# Patient Record
Sex: MALE | URBAN - METROPOLITAN AREA
[De-identification: names, ages, dates, MRNs, and addresses within clinical notes are randomized per-mention and may not be internally consistent; named-entity substitution may affect disease eponyms.]

---

## 2020-11-25 PROBLEM — U07.1 PNEUMONIA DUE TO COVID-19 VIRUS: Status: ACTIVE | Noted: 2020-11-25

## 2020-11-25 PROBLEM — J12.82 PNEUMONIA DUE TO COVID-19 VIRUS: Status: ACTIVE | Noted: 2020-11-25

## 2020-11-26 ENCOUNTER — APPOINTMENT (OUTPATIENT)
Dept: GENERAL RADIOLOGY | Age: 65
DRG: 177 | End: 2020-11-26
Attending: INTERNAL MEDICINE
Payer: MEDICARE

## 2020-11-26 ENCOUNTER — HOSPITAL ENCOUNTER (INPATIENT)
Age: 65
LOS: 12 days | Discharge: HOME | DRG: 177 | End: 2020-12-08
Attending: INTERNAL MEDICINE | Admitting: INTERNAL MEDICINE
Payer: MEDICARE

## 2020-11-26 LAB
A/G RATIO: 0.9 (ref 1.1–2.2)
ALBUMIN SERPL-MCNC: 3.4 G/DL (ref 3.4–5)
ALP BLD-CCNC: 58 U/L (ref 40–129)
ALT SERPL-CCNC: 35 U/L (ref 10–40)
ANION GAP SERPL CALCULATED.3IONS-SCNC: 10 MMOL/L (ref 3–16)
AST SERPL-CCNC: 54 U/L (ref 15–37)
BILIRUB SERPL-MCNC: 0.4 MG/DL (ref 0–1)
BUN BLDV-MCNC: 18 MG/DL (ref 7–20)
CALCIUM SERPL-MCNC: 8.4 MG/DL (ref 8.3–10.6)
CHLORIDE BLD-SCNC: 98 MMOL/L (ref 99–110)
CO2: 20 MMOL/L (ref 21–32)
CREAT SERPL-MCNC: 0.9 MG/DL (ref 0.8–1.3)
GFR AFRICAN AMERICAN: >60
GFR NON-AFRICAN AMERICAN: >60
GLOBULIN: 3.9 G/DL
GLUCOSE BLD-MCNC: 279 MG/DL (ref 70–99)
POTASSIUM REFLEX MAGNESIUM: 4.9 MMOL/L (ref 3.5–5.1)
SODIUM BLD-SCNC: 128 MMOL/L (ref 136–145)
TOTAL PROTEIN: 7.3 G/DL (ref 6.4–8.2)

## 2020-11-26 PROCEDURE — 6370000000 HC RX 637 (ALT 250 FOR IP): Performed by: INTERNAL MEDICINE

## 2020-11-26 PROCEDURE — 86850 RBC ANTIBODY SCREEN: CPT

## 2020-11-26 PROCEDURE — 6360000002 HC RX W HCPCS: Performed by: INTERNAL MEDICINE

## 2020-11-26 PROCEDURE — 71045 X-RAY EXAM CHEST 1 VIEW: CPT

## 2020-11-26 PROCEDURE — 86900 BLOOD TYPING SEROLOGIC ABO: CPT

## 2020-11-26 PROCEDURE — 36415 COLL VENOUS BLD VENIPUNCTURE: CPT

## 2020-11-26 PROCEDURE — 80053 COMPREHEN METABOLIC PANEL: CPT

## 2020-11-26 PROCEDURE — 2580000003 HC RX 258: Performed by: INTERNAL MEDICINE

## 2020-11-26 PROCEDURE — 86901 BLOOD TYPING SEROLOGIC RH(D): CPT

## 2020-11-26 PROCEDURE — 94761 N-INVAS EAR/PLS OXIMETRY MLT: CPT

## 2020-11-26 PROCEDURE — 2700000000 HC OXYGEN THERAPY PER DAY

## 2020-11-26 PROCEDURE — 2060000000 HC ICU INTERMEDIATE R&B

## 2020-11-26 PROCEDURE — 2500000003 HC RX 250 WO HCPCS: Performed by: INTERNAL MEDICINE

## 2020-11-26 PROCEDURE — XW13325 TRANSFUSION OF CONVALESCENT PLASMA (NONAUTOLOGOUS) INTO PERIPHERAL VEIN, PERCUTANEOUS APPROACH, NEW TECHNOLOGY GROUP 5: ICD-10-PCS | Performed by: INTERNAL MEDICINE

## 2020-11-26 PROCEDURE — 99291 CRITICAL CARE FIRST HOUR: CPT | Performed by: INTERNAL MEDICINE

## 2020-11-26 RX ORDER — LORATADINE 10 MG/1
10 CAPSULE, LIQUID FILLED ORAL DAILY
COMMUNITY

## 2020-11-26 RX ORDER — ACETAMINOPHEN 325 MG/1
650 TABLET ORAL EVERY 6 HOURS PRN
Status: DISCONTINUED | OUTPATIENT
Start: 2020-11-26 | End: 2020-12-08 | Stop reason: HOSPADM

## 2020-11-26 RX ORDER — SODIUM CHLORIDE 0.9 % (FLUSH) 0.9 %
10 SYRINGE (ML) INJECTION PRN
Status: DISCONTINUED | OUTPATIENT
Start: 2020-11-26 | End: 2020-12-08 | Stop reason: HOSPADM

## 2020-11-26 RX ORDER — 0.9 % SODIUM CHLORIDE 0.9 %
30 INTRAVENOUS SOLUTION INTRAVENOUS PRN
Status: DISCONTINUED | OUTPATIENT
Start: 2020-11-26 | End: 2020-12-08 | Stop reason: HOSPADM

## 2020-11-26 RX ORDER — DEXAMETHASONE SODIUM PHOSPHATE 10 MG/ML
10 INJECTION, SOLUTION INTRAMUSCULAR; INTRAVENOUS DAILY
Status: DISCONTINUED | OUTPATIENT
Start: 2020-11-27 | End: 2020-11-26

## 2020-11-26 RX ORDER — SODIUM CHLORIDE 9 MG/ML
INJECTION, SOLUTION INTRAVENOUS CONTINUOUS
Status: DISCONTINUED | OUTPATIENT
Start: 2020-11-26 | End: 2020-11-26

## 2020-11-26 RX ORDER — DEXTROSE MONOHYDRATE 25 G/50ML
12.5 INJECTION, SOLUTION INTRAVENOUS PRN
Status: DISCONTINUED | OUTPATIENT
Start: 2020-11-26 | End: 2020-12-08 | Stop reason: HOSPADM

## 2020-11-26 RX ORDER — LOSARTAN POTASSIUM AND HYDROCHLOROTHIAZIDE 12.5; 1 MG/1; MG/1
1 TABLET ORAL DAILY
Status: ON HOLD | COMMUNITY
End: 2020-12-08 | Stop reason: HOSPADM

## 2020-11-26 RX ORDER — ATORVASTATIN CALCIUM 40 MG/1
40 TABLET, FILM COATED ORAL DAILY
COMMUNITY

## 2020-11-26 RX ORDER — ONDANSETRON 2 MG/ML
4 INJECTION INTRAMUSCULAR; INTRAVENOUS EVERY 6 HOURS PRN
Status: DISCONTINUED | OUTPATIENT
Start: 2020-11-26 | End: 2020-12-08 | Stop reason: HOSPADM

## 2020-11-26 RX ORDER — INSULIN GLARGINE 100 [IU]/ML
10 INJECTION, SOLUTION SUBCUTANEOUS DAILY
Status: DISCONTINUED | OUTPATIENT
Start: 2020-11-26 | End: 2020-11-27

## 2020-11-26 RX ORDER — NICOTINE POLACRILEX 4 MG
15 LOZENGE BUCCAL PRN
Status: DISCONTINUED | OUTPATIENT
Start: 2020-11-26 | End: 2020-12-08 | Stop reason: HOSPADM

## 2020-11-26 RX ORDER — 0.9 % SODIUM CHLORIDE 0.9 %
20 INTRAVENOUS SOLUTION INTRAVENOUS ONCE
Status: DISCONTINUED | OUTPATIENT
Start: 2020-11-26 | End: 2020-11-26

## 2020-11-26 RX ORDER — DEXAMETHASONE SODIUM PHOSPHATE 10 MG/ML
6 INJECTION, SOLUTION INTRAMUSCULAR; INTRAVENOUS DAILY
Status: DISCONTINUED | OUTPATIENT
Start: 2020-11-26 | End: 2020-11-26

## 2020-11-26 RX ORDER — AMLODIPINE BESYLATE 5 MG/1
5 TABLET ORAL DAILY
COMMUNITY

## 2020-11-26 RX ORDER — POLYETHYLENE GLYCOL 3350 17 G/17G
17 POWDER, FOR SOLUTION ORAL DAILY PRN
Status: DISCONTINUED | OUTPATIENT
Start: 2020-11-26 | End: 2020-12-08 | Stop reason: HOSPADM

## 2020-11-26 RX ORDER — LEVOTHYROXINE SODIUM 0.1 MG/1
100 TABLET ORAL DAILY
COMMUNITY

## 2020-11-26 RX ORDER — DEXAMETHASONE SODIUM PHOSPHATE 10 MG/ML
6 INJECTION, SOLUTION INTRAMUSCULAR; INTRAVENOUS ONCE
Status: COMPLETED | OUTPATIENT
Start: 2020-11-26 | End: 2020-11-26

## 2020-11-26 RX ORDER — PROMETHAZINE HYDROCHLORIDE 25 MG/1
12.5 TABLET ORAL EVERY 6 HOURS PRN
Status: DISCONTINUED | OUTPATIENT
Start: 2020-11-26 | End: 2020-12-08 | Stop reason: HOSPADM

## 2020-11-26 RX ORDER — SODIUM CHLORIDE 0.9 % (FLUSH) 0.9 %
10 SYRINGE (ML) INJECTION EVERY 12 HOURS SCHEDULED
Status: DISCONTINUED | OUTPATIENT
Start: 2020-11-26 | End: 2020-12-08 | Stop reason: HOSPADM

## 2020-11-26 RX ORDER — DEXTROSE MONOHYDRATE 50 MG/ML
100 INJECTION, SOLUTION INTRAVENOUS PRN
Status: DISCONTINUED | OUTPATIENT
Start: 2020-11-26 | End: 2020-12-08 | Stop reason: HOSPADM

## 2020-11-26 RX ORDER — GLIPIZIDE 10 MG/1
10 TABLET ORAL
COMMUNITY

## 2020-11-26 RX ORDER — 0.9 % SODIUM CHLORIDE 0.9 %
20 INTRAVENOUS SOLUTION INTRAVENOUS ONCE
Status: DISCONTINUED | OUTPATIENT
Start: 2020-11-26 | End: 2020-12-01

## 2020-11-26 RX ORDER — ACETAMINOPHEN 650 MG/1
650 SUPPOSITORY RECTAL EVERY 6 HOURS PRN
Status: DISCONTINUED | OUTPATIENT
Start: 2020-11-26 | End: 2020-12-08 | Stop reason: HOSPADM

## 2020-11-26 RX ORDER — DEXAMETHASONE SODIUM PHOSPHATE 10 MG/ML
6 INJECTION, SOLUTION INTRAMUSCULAR; INTRAVENOUS DAILY
Status: DISCONTINUED | OUTPATIENT
Start: 2020-11-27 | End: 2020-12-06

## 2020-11-26 RX ADMIN — Medication 10 ML: at 08:45

## 2020-11-26 RX ADMIN — ENOXAPARIN SODIUM 30 MG: 30 INJECTION SUBCUTANEOUS at 08:45

## 2020-11-26 RX ADMIN — Medication 10 ML: at 23:17

## 2020-11-26 RX ADMIN — INSULIN GLARGINE 10 UNITS: 100 INJECTION, SOLUTION SUBCUTANEOUS at 13:08

## 2020-11-26 RX ADMIN — SODIUM CHLORIDE: 9 INJECTION, SOLUTION INTRAVENOUS at 01:25

## 2020-11-26 RX ADMIN — ACETAMINOPHEN 650 MG: 325 TABLET ORAL at 16:29

## 2020-11-26 RX ADMIN — Medication 10 ML: at 16:30

## 2020-11-26 RX ADMIN — INSULIN LISPRO 3 UNITS: 100 INJECTION, SOLUTION INTRAVENOUS; SUBCUTANEOUS at 23:33

## 2020-11-26 RX ADMIN — ENOXAPARIN SODIUM 30 MG: 30 INJECTION SUBCUTANEOUS at 23:16

## 2020-11-26 RX ADMIN — REMDESIVIR 200 MG: 100 INJECTION, POWDER, LYOPHILIZED, FOR SOLUTION INTRAVENOUS at 08:45

## 2020-11-26 RX ADMIN — DEXAMETHASONE SODIUM PHOSPHATE 6 MG: 10 INJECTION, SOLUTION INTRAMUSCULAR; INTRAVENOUS at 08:45

## 2020-11-26 RX ADMIN — DEXAMETHASONE SODIUM PHOSPHATE 6 MG: 10 INJECTION, SOLUTION INTRAMUSCULAR; INTRAVENOUS at 16:30

## 2020-11-26 ASSESSMENT — PAIN SCALES - GENERAL
PAINLEVEL_OUTOF10: 0
PAINLEVEL_OUTOF10: 0

## 2020-11-26 NOTE — PROGRESS NOTES
Patient admitted to room 228 from dirct admit. Patient oriented to room, call light, bed rails, phone, lights and bathroom. Patient instructed about the schedule of the day including: vital sign frequency, lab draws, possible tests, frequency of MD and staff rounds, daily weights, I &O's and prescribed diet. Bed alarm deferred patient low fall risk and refuses alarm. Telemetry box in place, patient aware of placement and reason. Bed locked, in lowest position, side rails up 2/4, call light within reach. Recliner Assessment:     Patient is able to demonstrate the ability to move from a reclining position to an upright position within the recliner. 4 Eyes Skin Assessment:     The patient is being assess for   Admission       Areas assessed by both nurses:   [x]   Head, Face, and Ears   [x]   Shoulders, Back, and Chest, Abdomen  [x]   Arms, Elbows, and Hands   [x]   Coccyx, Sacrum, and Ischium  [x]   Legs, Feet, and Heels        Scattered bruising    Does the Patient have Skin Breakdown?   No          Doc Prevention initiated:  No   Wound Care Orders initiated:  No      [facility] nurse consulted for Pressure Injury (Stage 3,4, Unstageable, DTI, NWPT, Complex wounds)and New or Established Ostomies:  No      Primary Nurse eSignature: Electronically signed by Anni Bourne RN on 11/26/20 at 4:15 AM EST

## 2020-11-26 NOTE — PROGRESS NOTES
Lab communicated to me that a ticket is out to allow labs to flow into Epic and show up in our system. Until this is fixed we cannot get convalescent plasma to the patient.

## 2020-11-26 NOTE — PROGRESS NOTES
Spoke to Dr. Gage Hoff regarding orders for patient's regular home medications and for sliding scale insulin. Dr. Gage Hoff is to place orders.

## 2020-11-26 NOTE — PROGRESS NOTES
Continuous pulse ox not picking up after switching tele box, wires, pulse ox. Respiratory placed a standalone pulse ox in room.

## 2020-11-26 NOTE — PROGRESS NOTES
Report given and care transferred to \[facility]\"". Patient in stable condition on 13 L HFNC at time of care transfer.

## 2020-11-26 NOTE — PROGRESS NOTES
11/26/20 0845   Vital Signs   Temp 98.4 °F (36.9 °C)   Temp Source Oral   Pulse 100   Heart Rate Source Monitor   Resp 18   /66   BP Location Right upper arm   BP Upper/Lower Upper   Patient Position Semi fowlers   Level of Consciousness 0   MEWS Score 1   Patient Currently in Pain Denies   Pain Assessment   Pain Assessment 0-10   Pain Level 0   Oxygen Therapy   SpO2 (!) 88 %   O2 Device Nasal cannula   O2 Flow Rate (L/min) 5 L/min     Patient's vital signs as above this am. Switched patient to high flow nasal cannula at 6 L and patient's oxygen saturation stayed at 89%. Patient then was increased to 10 L HFNC and came up to 93%. I then switched out patient's telemetry box and was able to titrate patient's oxygen down to 6 L HFNC again and got a reading of 93%.

## 2020-11-26 NOTE — CONSULTS
Reason for referral and CC: COVIS 19 pneumonia    HISTORY OF PRESENT ILLNESS: 69-year-old male with a history of diabetes and hypertension presented to outside emergency room with complaint of progressive shortness of breath. Found to be hypoxic and have COVID-19 pneumonia was admitted for further care. Past Medical History:   Diagnosis Date    Diabetes mellitus ([address]     Hyperlipidemia     Hypertension     Thyroid disease      Past Surgical History:   Procedure Laterality Date    CORONARY ANGIOPLASTY      HERNIA REPAIR       Family History  family history is not on file. Social History:  reports that he quit smoking about 17 years ago. His smoking use included cigarettes. He has a 5.00 pack-year smoking history. He has never used smokeless tobacco.   has no history on file for alcohol. ALLERGIES:  Patient has No Known Allergies.   Continuous Infusions:   dextrose       Scheduled Meds:   sodium chloride flush  10 mL Intravenous 2 times per day    enoxaparin  30 mg Subcutaneous BID    [START ON 11/27/2020] remdesivir IVPB  100 mg Intravenous Q24H    insulin lispro  0-12 Units Subcutaneous TID WC    insulin lispro  0-6 Units Subcutaneous Nightly    insulin glargine  10 Units Subcutaneous Daily    sodium chloride  20 mL Intravenous Once    [START ON 11/27/2020] dexamethasone  6 mg Intravenous Daily    sodium chloride  20 mL Intravenous Once     PRN Meds:  sodium chloride flush, acetaminophen **OR** acetaminophen, polyethylene glycol, promethazine **OR** ondansetron, sodium chloride, glucose, dextrose, glucagon (rDNA), dextrose    REVIEW OF SYSTEMS:  Constitutional: Negative for fever  HENT: Negative for sore throat  Eyes: Negative for redness   Respiratory: + for dyspnea, cough  Cardiovascular: Negative for chest pain  Gastrointestinal: Negative for vomiting, diarrhea   Genitourinary: Negative for hematuria   Musculoskeletal: Negative for arthralgias   Skin: Negative for rash  Neurological: Negative for syncope  Hematological: Negative for adenopathy  Psychiatric/Behavorial: Negative for anxiety    PHYSICAL EXAM: /74   Pulse 91   Temp 100.3 °F (37.9 °C) (Oral)   Resp 20   Ht 5' 9\" (1.753 m)   Wt 231 lb 8 oz (105 kg)   SpO2 91%   BMI 34.19 kg/m²  on 13lpm HFNC  Constitutional:  No acute distress. Eyes: PERRL. Conjunctivae anicteric. ENT: Normal nose. Normal tongue. Neck:  Trachea is midline. No thyroid tenderness. Respiratory: No accessory muscle usage. decreased breath sounds. No wheezes. No rales. No Rhonchi. Cardiovascular: Normal S1S2. No digit clubbing. No digit cyanosis. No LE edema. Capillary refill is normal.   Gastrointestinal: No mass palpated. No tenderness palpated. No umbilical hernia. Lymphatic: No cervical or supraclavicular adenopathy. Skin: No rash on the exposed extremities. No Nodules or induration on exposed extremities. Psychiatric: No anxiety or Agitation. Alert and Oriented to person, place and time. CBC: No results for input(s): WBC, HGB, HCT, MCV, PLT in the last 72 hours. BMP:   Recent Labs     11/26/20  0629   *   K 4.9   CL 98*   CO2 20*   BUN 18   CREATININE 0.9        Recent Labs     11/26/20  0629   AST 54*   ALT 35   BILITOT 0.4   ALKPHOS 58     No results for input(s): PROTIME, INR in the last 72 hours. No results for input(s): NITRITE, COLORU, PHUR, LABCAST, WBCUA, RBCUA, MUCUS, TRICHOMONAS, YEAST, BACTERIA, CLARITYU, SPECGRAV, LEUKOCYTESUR, UROBILINOGEN, BILIRUBINUR, BLOODU, GLUCOSEU, AMORPHOUS in the last 72 hours. Invalid input(s): KETONESU  No results for input(s): PHART, UYV2HOC, PO2ART in the last 72 hours.     Chest imaging was reviewed by me and showed CXR 11/26  Patchy airspace opacities suspicious for pneumonia.  An atypical or viral    pneumonia is suspected       ASSESSMENT:  · COVID 19 pneumonia  · Acute hypoxic respiratory failure  · HTN  · DM2  · Hyponatremia    PLAN:  · Patient is on high flow nasal cannula for life-threatening respiratory failure  Supplemental oxygen to maintain SaO2 >92%; wean as tolerated   · Transfuse 1 u CCP today  · Remdesivir day #1; monitor LFT and renal  · Decadron day #1; monitor glucose  · SSI/lantus  · Lovenox BID  · CCT 32 min    Thank you Valentín Anthony MD for this consult

## 2020-11-26 NOTE — PROGRESS NOTES
4 Eyes Skin Assessment     The patient is being assess for   Transfer to New Unit    I agree that 2 RN's have performed a thorough Head to Toe Skin Assessment on the patient. ALL assessment sites listed below have been assessed. Areas assessed by both nurses:   [x]   Head, Face, and Ears   [x]   Shoulders, Back, and Chest, Abdomen  [x]   Arms, Elbows, and Hands   [x]   Coccyx, Sacrum, and Ischium  [x]   Legs, Feet, and Heels        Scattered scabs and bruises, dry flaky lower extremities. **SHARE this note so that the co-signing nurse is able to place an eSignature**    Co-signer eSignature: {Esignature:[id number]}    Does the Patient have Skin Breakdown?   No          Doc Prevention initiated:  No   Wound Care Orders initiated:  No      [facility] nurse consulted for Pressure Injury (Stage 3,4, Unstageable, DTI, NWPT, Complex wounds)and New or Established Ostomies:  No      Primary Nurse eSignature: Electronically signed by Nai Cornejo RN on 11/26/20 at 5:22 PM EST

## 2020-11-27 LAB
ABO/RH: NORMAL
ANTIBODY SCREEN: NORMAL
BLOOD BANK DISPENSE STATUS: NORMAL
BLOOD BANK PRODUCT CODE: NORMAL
BPU ID: NORMAL
DESCRIPTION BLOOD BANK: NORMAL
GLUCOSE BLD-MCNC: 200 MG/DL (ref 70–99)
GLUCOSE BLD-MCNC: 203 MG/DL (ref 70–99)
GLUCOSE BLD-MCNC: 217 MG/DL (ref 70–99)
GLUCOSE BLD-MCNC: 263 MG/DL (ref 70–99)
PERFORMED ON: ABNORMAL

## 2020-11-27 PROCEDURE — 94660 CPAP INITIATION&MGMT: CPT

## 2020-11-27 PROCEDURE — 6360000002 HC RX W HCPCS: Performed by: INTERNAL MEDICINE

## 2020-11-27 PROCEDURE — 2580000003 HC RX 258: Performed by: INTERNAL MEDICINE

## 2020-11-27 PROCEDURE — 99291 CRITICAL CARE FIRST HOUR: CPT | Performed by: INTERNAL MEDICINE

## 2020-11-27 PROCEDURE — 94761 N-INVAS EAR/PLS OXIMETRY MLT: CPT

## 2020-11-27 PROCEDURE — U0003 INFECTIOUS AGENT DETECTION BY NUCLEIC ACID (DNA OR RNA); SEVERE ACUTE RESPIRATORY SYNDROME CORONAVIRUS 2 (SARS-COV-2) (CORONAVIRUS DISEASE [COVID-19]), AMPLIFIED PROBE TECHNIQUE, MAKING USE OF HIGH THROUGHPUT TECHNOLOGIES AS DESCRIBED BY CMS-2020-01-R: HCPCS

## 2020-11-27 PROCEDURE — 6370000000 HC RX 637 (ALT 250 FOR IP): Performed by: INTERNAL MEDICINE

## 2020-11-27 PROCEDURE — XW033E5 INTRODUCTION OF REMDESIVIR ANTI-INFECTIVE INTO PERIPHERAL VEIN, PERCUTANEOUS APPROACH, NEW TECHNOLOGY GROUP 5: ICD-10-PCS | Performed by: INTERNAL MEDICINE

## 2020-11-27 PROCEDURE — 2060000000 HC ICU INTERMEDIATE R&B

## 2020-11-27 PROCEDURE — 2500000003 HC RX 250 WO HCPCS: Performed by: INTERNAL MEDICINE

## 2020-11-27 PROCEDURE — 2700000000 HC OXYGEN THERAPY PER DAY

## 2020-11-27 RX ORDER — LEVOTHYROXINE SODIUM 0.1 MG/1
100 TABLET ORAL DAILY
Status: DISCONTINUED | OUTPATIENT
Start: 2020-11-27 | End: 2020-12-08 | Stop reason: HOSPADM

## 2020-11-27 RX ORDER — INSULIN GLARGINE 100 [IU]/ML
15 INJECTION, SOLUTION SUBCUTANEOUS 2 TIMES DAILY
Status: DISCONTINUED | OUTPATIENT
Start: 2020-11-27 | End: 2020-12-01

## 2020-11-27 RX ORDER — LOSARTAN POTASSIUM 25 MG/1
50 TABLET ORAL DAILY
Status: DISCONTINUED | OUTPATIENT
Start: 2020-11-27 | End: 2020-12-08 | Stop reason: HOSPADM

## 2020-11-27 RX ADMIN — INSULIN LISPRO 6 UNITS: 100 INJECTION, SOLUTION INTRAVENOUS; SUBCUTANEOUS at 17:11

## 2020-11-27 RX ADMIN — INSULIN LISPRO 3 UNITS: 100 INJECTION, SOLUTION INTRAVENOUS; SUBCUTANEOUS at 21:48

## 2020-11-27 RX ADMIN — Medication 5000 UNITS: at 10:46

## 2020-11-27 RX ADMIN — INSULIN GLARGINE 15 UNITS: 100 INJECTION, SOLUTION SUBCUTANEOUS at 11:45

## 2020-11-27 RX ADMIN — DEXAMETHASONE SODIUM PHOSPHATE 6 MG: 10 INJECTION, SOLUTION INTRAMUSCULAR; INTRAVENOUS at 10:17

## 2020-11-27 RX ADMIN — INSULIN LISPRO 6 UNITS: 100 INJECTION, SOLUTION INTRAVENOUS; SUBCUTANEOUS at 12:49

## 2020-11-27 RX ADMIN — INSULIN GLARGINE 15 UNITS: 100 INJECTION, SOLUTION SUBCUTANEOUS at 21:48

## 2020-11-27 RX ADMIN — Medication 10 ML: at 21:46

## 2020-11-27 RX ADMIN — LOSARTAN POTASSIUM 50 MG: 25 TABLET, FILM COATED ORAL at 15:16

## 2020-11-27 RX ADMIN — LEVOTHYROXINE SODIUM 100 MCG: 100 TABLET ORAL at 15:16

## 2020-11-27 RX ADMIN — REMDESIVIR 100 MG: 100 INJECTION, POWDER, LYOPHILIZED, FOR SOLUTION INTRAVENOUS at 10:17

## 2020-11-27 RX ADMIN — ENOXAPARIN SODIUM 40 MG: 40 INJECTION SUBCUTANEOUS at 21:46

## 2020-11-27 ASSESSMENT — PAIN SCALES - GENERAL
PAINLEVEL_OUTOF10: 0
PAINLEVEL_OUTOF10: 0

## 2020-11-27 NOTE — PROGRESS NOTES
Patient was hovering 88-92% on 15+ L/M HFC. Spoke with Dr. Dylan Juarez an obtained VO to place on heated humidified high flow oxygen (AIRVO). Placed patient on 75% and 35 L/M temp of 34 degrees. SPO2 to 93%.

## 2020-11-27 NOTE — PROGRESS NOTES
Pulmonary & Critical Care Medicine ICU Progress Note    CC: COVID 19 pneumonia    Events of Last 24 hours: hypoxi worsened and pt started on vapotherm  75% Fio2/35lpm  Had felt ill for up to 2 weeks    Invasive Lines: IV:     MV:  D     / / /FiO2 : 75 %  No results for input(s): PHART, IDE4UQV, PO2ART in the last 72 hours. IV:   dextrose       Vitals:  BP (!) 142/79   Pulse 86   Temp 97 °F (36.1 °C) (Oral)   Resp 20   Ht 5' 9\" (1.753 m)   Wt 232 lb 3 oz (105.3 kg)   SpO2 92%   BMI 34.29 kg/m²   on vapotherm    Intake/Output Summary (Last 24 hours) at 11/27/2020 1344  Last data filed at 11/27/2020 1242  Gross per 24 hour   Intake 1295 ml   Output 1900 ml   Net -605 ml     EXAM:  Constitutional: ill appearing. Eyes: PERRL. No sclera icterus. ENT: Normal Nose. Normal Tongue. Neck:  Trachea is midline. No thyroid tenderness. Respiratory: No accessory muscle usage. decreased breath sounds. No wheezes. No rales. No Rhonchi. Cardiovascular: Normal S1S2. Brady Zane No murmur. No digit cyanosis. No digit clubbing. No LE edema. GI: Non-tender. Non-distended. Normal bowel sounds. No masses. No umbilical hernia. Skin: No rash on the exposed extremities. No Nodules on exposed extremities. Neuro: Alert. Follows commands. moves all extremities. Psych: No agitation, no anxiety.     Scheduled Meds:   insulin glargine  15 Units Subcutaneous BID    insulin lispro  0-18 Units Subcutaneous TID     insulin lispro  0-9 Units Subcutaneous Nightly    enoxaparin  40 mg Subcutaneous BID    vitamin D3  5,000 Units Oral Daily    levothyroxine  100 mcg Oral Daily    losartan  50 mg Oral Daily    sodium chloride flush  10 mL Intravenous 2 times per day    remdesivir IVPB  100 mg Intravenous Q24H    sodium chloride  20 mL Intravenous Once    dexamethasone  6 mg Intravenous Daily     PRN Meds:  sodium chloride flush, acetaminophen **OR** acetaminophen, polyethylene glycol, promethazine **OR** ondansetron, sodium chloride, glucose, dextrose, glucagon (rDNA), dextrose    Results:  CBC: No results for input(s): WBC, HGB, HCT, MCV, PLT in the last 72 hours. BMP:   Recent Labs     11/26/20  0629   *   K 4.9   CL 98*   CO2 20*   BUN 18   CREATININE 0.9     LIVER PROFILE:   Recent Labs     11/26/20  0629   AST 54*   ALT 35   BILITOT 0.4   ALKPHOS 58     Chest imaging was reviewed by me and showed CXR 11/26  Patchy airspace opacities suspicious for pneumonia.  An atypical or viral    pneumonia is suspected         ASSESSMENT:  · COVID 19 pneumonia (tested in [address])  · Acute hypoxic respiratory failure  · HTN  · DM2  · Hyponatremia     PLAN:  · Patient is on Vapotherm for life-threatening respiratory failure  · Supplemental oxygen to maintain SaO2 >92%; wean as tolerated   · s/p 1 u CCP  · Remdesivir day #2; monitor LFT and renal  · Decadron day #2; monitor glucose  · SSI/lantus  · Lovenox BID  · Total critical care time caring for this patient with life threatening, unstable organ failure, including direct patient contact, management of life support systems, review of data including imaging and labs, discussions with other team members and physicians at least 31 minutes so far today, excluding procedures.

## 2020-11-27 NOTE — PROGRESS NOTES
Vitals:    11/27/20 1027   BP: (!) 142/79   Pulse: 86   Resp: 20   Temp: 97 °F (36.1 °C)   SpO2: 92%     Pt in bed. Awake, alert oriented X4. Declines SOB at rest, pt noted to have dyspnea with exertion up to bedside commode. Shift assessment complete. Dry cough noted. O2 per RT- placed by Jackson Walker RT. AM meds.  notified of pt blood glucose 360. Will monitor.

## 2020-11-27 NOTE — ACP (ADVANCE CARE PLANNING)
Advance Care Planning     Advance Care Planning Activator (Inpatient)  Conversation Note      Date of ACP Conversation: 11/25/2020    Conversation Conducted with: Patient with Decision Making Capacity    ACP Activator: [address] Decision Maker:     Current Designated Health Care Decision Maker:   Primary Decision Maker: Sundeep Berkowitz Spouse - [phone]    Care Preferences    Ventilation: \"If you were in your present state of health and suddenly became very ill and were unable to breathe on your own, what would your preference be about the use of a ventilator (breathing machine) if it were available to you? \"      Would the patient desire the use of ventilator (breathing machine)?: yes    \"If your health worsens and it becomes clear that your chance of recovery is unlikely, what would your preference be about the use of a ventilator (breathing machine) if it were available to you? \"     Would the patient desire the use of ventilator (breathing machine)?: No      Resuscitation  \"CPR works best to restart the heart when there is a sudden event, like a heart attack, in someone who is otherwise healthy. Unfortunately, CPR does not typically restart the heart for people who have serious health conditions or who are very sick. \"    \"In the event your heart stopped as a result of an underlying serious health condition, would you want attempts to be made to restart your heart (answer \"yes\" for attempt to resuscitate) or would you prefer a natural death (answer \"no\" for do not attempt to resuscitate)? \" yes

## 2020-11-27 NOTE — CARE COORDINATION
Case Management Assessment  Initial Evaluation      Patient Name: Anmol Shields  YOB: 1955  Diagnosis: Pneumonia due to COVID-19 virus [U07.1, J12.89]  Date / Time: 11/26/2020  1:02 AM    Admission status/Date:  11/25/2020  Chart Reviewed: Yes      Patient Interviewed: Yes   Family Interviewed:  No      Hospitalization in the last 30 days:  No      Health Care Decision Maker :   Primary Decision Maker: Kelly Manzanares - Spouse - [phone]      Met with: patient  Allen Goetz conducted  (bedside/phone): phone (C19 restrictions)    Current PCP: Dr. Oz Barillas ([facility])  [facility] [address]. Medicare  Precert required for SNF : WAIVED          3 night stay required - NA    ADLS  Support Systems/Care Needs:    Transportation: self    Meal Preparation: self    Housing  Living Arrangements: Bilevel house w/sp. Steps: 3 steps to lower level , 8 steps to upper level. Pt is ambulatory w/o assistive device. Intent for return to present living arrangements: Yes  Identified Issues: NA    Home Care Information  Active with Home Health Care : No      Passport/Waiver : No           Durable Medical Equiptment   DME Provider: NA  Equipment:   Walker___Cane___RTS___ BSC___Shower Chair___Hospital Bed___W/C____Other________  02 at ____Liter(s)---wears(frequency)_______ [facility] ___ CPAP_X__ BiPap___   N/A____      Home O2 Use :  No  - currently requiring supplemental O2 +C19 15 liters per HFNC. + home CPAP, No home O2. Community Service Affiliation  Dialysis:  No    · Agency:  · Location:  · Dialysis Schedule:  · Phone:   · Fax: Other Community Services: NA    DISCHARGE PLAN: Explained Case Management role/services. Chart reviewed. Met with pt via phone due to C19 restrictions, explained the role of the CM. Plans to return home. + IPTA, amb w/o device, +drives. +CM following for potential home O2 needs (Currently on HFNC).

## 2020-11-27 NOTE — H&P
Form received at [facility] on 12/20/18. Please note that is takes 7-10 business days for completion. Signed authorization received with form.      Provider, MD   metFORMIN (GLUCOPHAGE) 500 MG tablet Take 500 mg by mouth 2 times daily (with meals)   Yes Historical Provider, MD       Allergies:  Patient has no known allergies. Social History:  The patient currently lives at home. TOBACCO:   reports that he quit smoking about 17 years ago. His smoking use included cigarettes. He has a 5.00 pack-year smoking history. He has never used smokeless tobacco.  ETOH:   has no history on file for alcohol. Family History:  Reviewed in detail and negative for DM, Early CAD, Cancer, CVA. Positive as follows:    No family history on file. REVIEW OF SYSTEMS:   As noted in the HPI. All other systems reviewed and negative. PHYSICAL EXAM:    BP (!) 142/79   Pulse 86   Temp 97 °F (36.1 °C) (Oral)   Resp 20   Ht 5' 9\" (1.753 m)   Wt 232 lb 3 oz (105.3 kg)   SpO2 92%   BMI 34.29 kg/m²     General appearance: No apparent distress appears stated age and cooperative. HEENT Normal cephalic, atraumatic without obvious deformity. Pupils equal, round, and reactive to light. Extra ocular muscles intact. Conjunctivae/corneas clear. Neck: Supple, No jugular venous distention/bruits. Trachea midline without thyromegaly or adenopathy with full range of motion. Lungs: Clear to auscultation, bilaterally without Rales/Wheezes/Rhonchi with good respiratory effort. Heart: Regular rate and rhythm with Normal S1/S2 without murmurs, rubs or gallops, point of maximum impulse non-displaced  Abdomen: Soft, non-tender or non-distended without rigidity or guarding and positive bowel sounds all four quadrants. Extremities: No clubbing, cyanosis, or edema bilaterally. Full range of motion without deformity and normal gait intact. Skin: Skin color, texture, turgor normal.  No rashes or lesions. Neurologic: Alert and oriented X 3, neurovascularly intact with sensory/motor intact upper extremities/lower extremities, bilaterally.   Cranial nerves: II-XII intact, grossly non-focal.  Mental status: Alert, oriented, thought content appropriate. Capillary Refill: Acceptable  < 3 seconds  Peripheral Pulses: +3 Easily felt, not easily obliterated with pressure        CBC   No results for input(s): WBC, HGB, HCT, PLT in the last 72 hours. RENAL  Recent Labs     11/26/20  0629   *   K 4.9   CL 98*   CO2 20*   BUN 18   CREATININE 0.9     LFT'S  Recent Labs     11/26/20  0629   AST 54*   ALT 35   BILITOT 0.4   ALKPHOS 58     COAG  No results for input(s): INR in the last 72 hours. CARDIAC ENZYMES  No results for input(s): CKTOTAL, CKMB, CKMBINDEX, TROPONINI in the last 72 hours. U/A:  No results found for: NITRITE, COLORU, WBCUA, RBCUA, MUCUS, BACTERIA, CLARITYU, SPECGRAV, LEUKOCYTESUR, BLOODU, GLUCOSEU, AMORPHOUS    ABG  No results found for: MPK9EGP, BEART, X0AIRUNX, PHART, THGBART, HBB9QPO, PO2ART, MDN1TSK        Active Hospital Problems    Diagnosis Date Noted    Pneumonia due to COVID-19 virus [U07.1, J12.89] 11/25/2020         PHYSICIANS CERTIFICATION:    I certify that Derrell Gurrola is expected to be hospitalized for more than 2 midnights based on the following assessment and plan:      ASSESSMENT/PLAN:    Acute Hypox Resp Failure. Presumed COVID PNA - reportedly tested positive in [place]. S/p convalescent plasma 11/26 1 unit  Lovenox 40BID. Remdesivir started 11/27. Dexamethasone started 11/27. VitD. DMII with steroid hyperglycemia. Increase lantus to 15BID  ISS to high dose. CCD. Hypothyroid - resume synthroid. HTN - resume losartan. Hold HCTZ with hyponatremia. DVT Prophylaxis: lovenox BID  Diet: DIET CARB CONTROL;  Code Status: Full Code      Dispo - inpatient PCU. Harriet Mancilla MD    Thank you No primary care provider on file. for the opportunity to be involved in this patient's care. If you have any questions or concerns please feel free to contact me at [phone].

## 2020-11-27 NOTE — PROGRESS NOTES
Care assumed at this time. o2 flow rate increased to 15L, patient O2 saturation 88-90% while sleeping. Now 92%. Patient denies SOB, A&Ox4.

## 2020-11-28 LAB
A/G RATIO: 0.7 (ref 1.1–2.2)
ALBUMIN SERPL-MCNC: 2.9 G/DL (ref 3.4–5)
ALP BLD-CCNC: 57 U/L (ref 40–129)
ALT SERPL-CCNC: 26 U/L (ref 10–40)
ANION GAP SERPL CALCULATED.3IONS-SCNC: 10 MMOL/L (ref 3–16)
AST SERPL-CCNC: 30 U/L (ref 15–37)
BASOPHILS ABSOLUTE: 0 K/UL (ref 0–0.2)
BASOPHILS RELATIVE PERCENT: 0 %
BILIRUB SERPL-MCNC: 0.4 MG/DL (ref 0–1)
BUN BLDV-MCNC: 24 MG/DL (ref 7–20)
CALCIUM SERPL-MCNC: 8.7 MG/DL (ref 8.3–10.6)
CHLORIDE BLD-SCNC: 105 MMOL/L (ref 99–110)
CO2: 17 MMOL/L (ref 21–32)
CREAT SERPL-MCNC: 0.7 MG/DL (ref 0.8–1.3)
EOSINOPHILS ABSOLUTE: 0 K/UL (ref 0–0.6)
EOSINOPHILS RELATIVE PERCENT: 0 %
GFR AFRICAN AMERICAN: >60
GFR NON-AFRICAN AMERICAN: >60
GLOBULIN: 4.2 G/DL
GLUCOSE BLD-MCNC: 123 MG/DL (ref 70–99)
GLUCOSE BLD-MCNC: 166 MG/DL (ref 70–99)
GLUCOSE BLD-MCNC: 176 MG/DL (ref 70–99)
GLUCOSE BLD-MCNC: 200 MG/DL (ref 70–99)
GLUCOSE BLD-MCNC: 201 MG/DL (ref 70–99)
GLUCOSE BLD-MCNC: 241 MG/DL (ref 70–99)
HCT VFR BLD CALC: 39.1 % (ref 40.5–52.5)
HEMOGLOBIN: 12.7 G/DL (ref 13.5–17.5)
LYMPHOCYTES ABSOLUTE: 0.8 K/UL (ref 1–5.1)
LYMPHOCYTES RELATIVE PERCENT: 10.7 %
MCH RBC QN AUTO: 33 PG (ref 26–34)
MCHC RBC AUTO-ENTMCNC: 32.6 G/DL (ref 31–36)
MCV RBC AUTO: 101.4 FL (ref 80–100)
MONOCYTES ABSOLUTE: 0.9 K/UL (ref 0–1.3)
MONOCYTES RELATIVE PERCENT: 11.4 %
NEUTROPHILS ABSOLUTE: 6 K/UL (ref 1.7–7.7)
NEUTROPHILS RELATIVE PERCENT: 77.9 %
PDW BLD-RTO: 14 % (ref 12.4–15.4)
PERFORMED ON: ABNORMAL
PLATELET # BLD: 220 K/UL (ref 135–450)
PMV BLD AUTO: 6.3 FL (ref 5–10.5)
POTASSIUM REFLEX MAGNESIUM: 4.5 MMOL/L (ref 3.5–5.1)
RBC # BLD: 3.85 M/UL (ref 4.2–5.9)
SODIUM BLD-SCNC: 132 MMOL/L (ref 136–145)
TOTAL PROTEIN: 7.1 G/DL (ref 6.4–8.2)
WBC # BLD: 7.7 K/UL (ref 4–11)

## 2020-11-28 PROCEDURE — 94660 CPAP INITIATION&MGMT: CPT

## 2020-11-28 PROCEDURE — 2500000003 HC RX 250 WO HCPCS: Performed by: INTERNAL MEDICINE

## 2020-11-28 PROCEDURE — 6360000002 HC RX W HCPCS: Performed by: INTERNAL MEDICINE

## 2020-11-28 PROCEDURE — 99291 CRITICAL CARE FIRST HOUR: CPT | Performed by: INTERNAL MEDICINE

## 2020-11-28 PROCEDURE — 6370000000 HC RX 637 (ALT 250 FOR IP): Performed by: INTERNAL MEDICINE

## 2020-11-28 PROCEDURE — 2700000000 HC OXYGEN THERAPY PER DAY

## 2020-11-28 PROCEDURE — 2000000000 HC ICU R&B

## 2020-11-28 PROCEDURE — 85025 COMPLETE CBC W/AUTO DIFF WBC: CPT

## 2020-11-28 PROCEDURE — 2580000003 HC RX 258: Performed by: INTERNAL MEDICINE

## 2020-11-28 PROCEDURE — 94761 N-INVAS EAR/PLS OXIMETRY MLT: CPT

## 2020-11-28 PROCEDURE — 36415 COLL VENOUS BLD VENIPUNCTURE: CPT

## 2020-11-28 PROCEDURE — 80053 COMPREHEN METABOLIC PANEL: CPT

## 2020-11-28 RX ADMIN — Medication 5000 UNITS: at 08:41

## 2020-11-28 RX ADMIN — ENOXAPARIN SODIUM 40 MG: 40 INJECTION SUBCUTANEOUS at 08:40

## 2020-11-28 RX ADMIN — INSULIN GLARGINE 15 UNITS: 100 INJECTION, SOLUTION SUBCUTANEOUS at 21:46

## 2020-11-28 RX ADMIN — Medication 10 ML: at 21:45

## 2020-11-28 RX ADMIN — DEXAMETHASONE SODIUM PHOSPHATE 6 MG: 10 INJECTION, SOLUTION INTRAMUSCULAR; INTRAVENOUS at 08:42

## 2020-11-28 RX ADMIN — LEVOTHYROXINE SODIUM 100 MCG: 100 TABLET ORAL at 04:59

## 2020-11-28 RX ADMIN — LOSARTAN POTASSIUM 50 MG: 25 TABLET, FILM COATED ORAL at 08:40

## 2020-11-28 RX ADMIN — INSULIN GLARGINE 15 UNITS: 100 INJECTION, SOLUTION SUBCUTANEOUS at 08:42

## 2020-11-28 RX ADMIN — ENOXAPARIN SODIUM 40 MG: 40 INJECTION SUBCUTANEOUS at 21:00

## 2020-11-28 RX ADMIN — INSULIN LISPRO 3 UNITS: 100 INJECTION, SOLUTION INTRAVENOUS; SUBCUTANEOUS at 21:45

## 2020-11-28 RX ADMIN — Medication 10 ML: at 08:43

## 2020-11-28 RX ADMIN — REMDESIVIR 100 MG: 100 INJECTION, POWDER, LYOPHILIZED, FOR SOLUTION INTRAVENOUS at 08:41

## 2020-11-28 ASSESSMENT — PAIN SCALES - GENERAL
PAINLEVEL_OUTOF10: 0
PAINLEVEL_OUTOF10: 0

## 2020-11-28 NOTE — PROGRESS NOTES
Patient to vapotherm from bipap at this time, vapotherm 100%, 35 lpm flow, sp02 96%, tolerating well at this time

## 2020-11-28 NOTE — PROGRESS NOTES
Reassessment complete. Patient currently awake in bed. No changes noted from previous assessment. Patient with no current needs voiced.

## 2020-11-28 NOTE — PROGRESS NOTES
Hospitalist Progress Note      PCP: No primary care provider on file. Date of Admission: 11/26/2020    Chief Complaint: SOB. The patient is a 77 y.o. male w hx of DMII, HTN, Hypothyroidism, who presents with SOB.      Pt reports he first started feeling ill 2 weeks ago. Him and his daughter went to get tested albeit he is unable to tell me where he was tested and what the result of the test was.      He was reportedly tested positive for COVID in [place] - I do not see the results in our system.      He presented to the hospital with worsening dyspnea and cough. He was found to be profoundly hypoxic - on AIRVO now 35l/min with 75% FiO2. Subjective:   11/28  oxygenation worse. Vapotherm, to ICU       Medications:  Reviewed    Infusion Medications    dextrose       Scheduled Medications    insulin glargine  15 Units Subcutaneous BID    insulin lispro  0-18 Units Subcutaneous TID WC    insulin lispro  0-9 Units Subcutaneous Nightly    enoxaparin  40 mg Subcutaneous BID    vitamin D3  5,000 Units Oral Daily    levothyroxine  100 mcg Oral Daily    losartan  50 mg Oral Daily    sodium chloride flush  10 mL Intravenous 2 times per day    remdesivir IVPB  100 mg Intravenous Q24H    sodium chloride  20 mL Intravenous Once    dexamethasone  6 mg Intravenous Daily     PRN Meds: sodium chloride flush, acetaminophen **OR** acetaminophen, polyethylene glycol, promethazine **OR** ondansetron, sodium chloride, glucose, dextrose, glucagon (rDNA), dextrose      Intake/Output Summary (Last 24 hours) at 11/28/2020 1402  Last data filed at 11/28/2020 0911  Gross per 24 hour   Intake 780 ml   Output 1200 ml   Net -420 ml       Physical Exam Performed:    /70   Pulse 90   Temp 97.7 °F (36.5 °C)   Resp 29   Ht 5' 9\" (1.753 m)   Wt 242 lb 14.4 oz (110.2 kg)   SpO2 91%   BMI 35.87 kg/m²     General appearance: No apparent distress appears stated age and cooperative.   HEENT Normal cephalic, atraumatic without obvious deformity. Pupils equal, round, and reactive to light. Extra ocular muscles intact. Conjunctivae/corneas clear. Neck: Supple, No jugular venous distention/bruits. Trachea midline without thyromegaly or adenopathy with full range of motion. Lungs: Clear to auscultation, bilaterally without Rales/Wheezes/Rhonchi with good respiratory effort. Heart: Regular rate and rhythm with Normal S1/S2 without murmurs, rubs or gallops, point of maximum impulse non-displaced  Abdomen: Soft, non-tender or non-distended without rigidity or guarding and positive bowel sounds all four quadrants. Extremities: No clubbing, cyanosis, or edema bilaterally. Full range of motion without deformity and normal gait intact. Skin: Skin color, texture, turgor normal.  No rashes or lesions. Neurologic: Alert and oriented X 3, neurovascularly intact with sensory/motor intact upper extremities/lower extremities, bilaterally. Cranial nerves: II-XII intact, grossly non-focal.  Mental status: Alert, oriented, thought content appropriate. Capillary Refill: Acceptable  < 3 seconds  Peripheral Pulses: +3 Easily felt, not easily obliterated with pressure         Labs:   Recent Labs     11/28/20 0459   WBC 7.7   HGB 12.7*   HCT 39.1*        Recent Labs     11/26/20 0629 11/28/20 0459   * 132*   K 4.9 4.5   CL 98* 105   CO2 20* 17*   BUN 18 24*   CREATININE 0.9 0.7*   CALCIUM 8.4 8.7     Recent Labs     11/26/20 0629 11/28/20 0459   AST 54* 30   ALT 35 26   BILITOT 0.4 0.4   ALKPHOS 58 57     No results for input(s): INR in the last 72 hours. No results for input(s): Connee Matar in the last 72 hours. Urinalysis:    No results found for: Alyne Claw, BACTERIA, RBCUA, BLOODU, [address]    Radiology:  XR CHEST 1 VIEW   Final Result   Patchy airspace opacities suspicious for pneumonia. An atypical or viral   pneumonia is suspected.                  Assessment/Plan:    Active Hospital Problems Diagnosis    Acute respiratory failure with hypoxia (HCC) [J96.01]    Pneumonia due to COVID-19 virus [U07.1, J12.89]     Acute Hypox Resp Failure. Presumed COVID PNA - reportedly tested positive in [address] [place] [address] S. S/p convalescent plasma 11/26 1 unit  Lovenox 40BID. Remdesivir started 11/27. Dexamethasone started 11/27. VitD.         DMII with steroid hyperglycemia. Increase lantus to 15BID  ISS to high dose. CCD.         Hypothyroid - resume synthroid.         HTN - resume losartan.    Hold HCTZ with hyponatremia.                  DVT Prophylaxis: lovenox BID  Diet: DIET CARB CONTROL;  Code Status: Full Code        Dispo - to ICU.        Hernán Gilbert MD

## 2020-11-28 NOTE — PROGRESS NOTES
Bedside handoff report given to [facility]. Pt transferred to ICU 16 for increased oxygen demands Carmenza yu notified via phone call. Belongings and room checked, brought with patient. Care transferred.

## 2020-11-28 NOTE — PROGRESS NOTES
Pulmonary & Critical Care Medicine ICU Progress Note    CC: COVID 19 pneumonia    Events of Last 24 hours:   90% Fio2/40lpm  Had felt ill for up to 2 weeks    Invasive Lines: IV:     MV:  D     / / /FiO2 : 90 %  No results for input(s): PHART, BJF8HHX, PO2ART in the last 72 hours. IV:   dextrose       Vitals:  /70   Pulse 90   Temp 97.7 °F (36.5 °C)   Resp 29   Ht 5' 9\" (1.753 m)   Wt 242 lb 14.4 oz (110.2 kg)   SpO2 91%   BMI 35.87 kg/m²   on vapotherm    Intake/Output Summary (Last 24 hours) at 11/28/2020 1201  Last data filed at 11/28/2020 0911  Gross per 24 hour   Intake 1170 ml   Output 1350 ml   Net -180 ml     EXAM:  Constitutional: ill appearing. Eyes: PERRL. No sclera icterus. ENT: Normal Nose. Normal Tongue. Neck:  Trachea is midline. No thyroid tenderness. Respiratory: No accessory muscle usage. decreased breath sounds. No wheezes. No rales. No Rhonchi. Cardiovascular: Normal S1S2. Crystal Myranda No murmur. No digit cyanosis. No digit clubbing. No LE edema. GI: Non-tender. Non-distended. Normal bowel sounds. No masses. No umbilical hernia. Neuro: Alert. Follows commands. moves all extremities. Psych: No agitation, no anxiety.     Scheduled Meds:   insulin glargine  15 Units Subcutaneous BID    insulin lispro  0-18 Units Subcutaneous TID WC    insulin lispro  0-9 Units Subcutaneous Nightly    enoxaparin  40 mg Subcutaneous BID    vitamin D3  5,000 Units Oral Daily    levothyroxine  100 mcg Oral Daily    losartan  50 mg Oral Daily    sodium chloride flush  10 mL Intravenous 2 times per day    remdesivir IVPB  100 mg Intravenous Q24H    sodium chloride  20 mL Intravenous Once    dexamethasone  6 mg Intravenous Daily     PRN Meds:  sodium chloride flush, acetaminophen **OR** acetaminophen, polyethylene glycol, promethazine **OR** ondansetron, sodium chloride, glucose, dextrose, glucagon (rDNA), dextrose    Results:  CBC:   Recent Labs     11/28/20  0459   WBC 7.7   HGB 12.7*   HCT 39.1*   .4*        BMP:   Recent Labs     11/26/20  0629 11/28/20  0459   * 132*   K 4.9 4.5   CL 98* 105   CO2 20* 17*   BUN 18 24*   CREATININE 0.9 0.7*     LIVER PROFILE:   Recent Labs     11/26/20  0629 11/28/20  0459   AST 54* 30   ALT 35 26   BILITOT 0.4 0.4   ALKPHOS 91 77     Chest imaging was reviewed by me and showed CXR 11/26  Patchy airspace opacities suspicious for pneumonia.  An atypical or viral    pneumonia is suspected         ASSESSMENT:  · COVID 19 pneumonia (tested in [facility])  · Acute hypoxic respiratory failure  · HTN  · DM2  · Hyponatremia     PLAN:  · Patient is on Vapotherm for life-threatening respiratory failure  · Supplemental oxygen to maintain SaO2 >92%; wean as tolerated   · s/p 1 u CCP  · Remdesivir day #3; monitor LFT and renal  · Decadron day #3; monitor glucose  · SSI/lantus  · Lovenox BID  · Total critical care time caring for this patient with life threatening, unstable organ failure, including direct patient contact, management of life support systems, review of data including imaging and labs, discussions with other team members and physicians at least 33 minutes so far today, excluding procedures.

## 2020-11-28 NOTE — PROGRESS NOTES
Pt in bed, denies needs at this time. No SB noted. Oxygen 88% at this time Fi02 90 45L, will update respiratory. Call light in reach.  Will monitor  Ines Forbes

## 2020-11-28 NOTE — PROGRESS NOTES
Spoke with Carmenza patient sister of patient status and increased oxygen demands possible ICU transfer.

## 2020-11-28 NOTE — PROGRESS NOTES
Pt in bed, awake, A/O X4. Shift assessment complete, night meds given. No C/O pain at this time. Night time snack given. Pt remains on 76 FI02 and 35L. Ying Grayson No other needs expressed. Call light in reach. Will monitor.    Kilo Smith

## 2020-11-28 NOTE — PROGRESS NOTES
Patient transferred to ICU per clinical RN. Patient connected to ICU monitoring. Respiratory at bedside, patient on AIRVO at 45 L and 90%. Patient oriented to room and current plan of care. Patient educated on call light and to call out with needs, verbalized understanding. Patient A/O x 4. Patient currently awake in bed. Patient's lung sounds diminished bilaterally. Heart rate sinus rhythm on bedside monitor, rates in 70s. Bowel sounds present x 4. No edema noted. No skin issues noted. Peripheral IV C/D, dressing reinforced, and functioning properly. Patient provided with urinal.    Patient voices no current needs.

## 2020-11-28 NOTE — PROGRESS NOTES
Pt in bed, eyes closed. No distress noted. Oxygen 91% . Call light in reach. Will continue to monitor.   Rock Salomon

## 2020-11-29 LAB
A/G RATIO: 0.8 (ref 1.1–2.2)
ALBUMIN SERPL-MCNC: 3.1 G/DL (ref 3.4–5)
ALP BLD-CCNC: 53 U/L (ref 40–129)
ALT SERPL-CCNC: 27 U/L (ref 10–40)
ANION GAP SERPL CALCULATED.3IONS-SCNC: 7 MMOL/L (ref 3–16)
AST SERPL-CCNC: 27 U/L (ref 15–37)
BASOPHILS ABSOLUTE: 0 K/UL (ref 0–0.2)
BASOPHILS RELATIVE PERCENT: 0.2 %
BILIRUB SERPL-MCNC: 0.5 MG/DL (ref 0–1)
BUN BLDV-MCNC: 20 MG/DL (ref 7–20)
CALCIUM SERPL-MCNC: 8.7 MG/DL (ref 8.3–10.6)
CHLORIDE BLD-SCNC: 103 MMOL/L (ref 99–110)
CO2: 25 MMOL/L (ref 21–32)
CREAT SERPL-MCNC: 0.6 MG/DL (ref 0.8–1.3)
EOSINOPHILS ABSOLUTE: 0 K/UL (ref 0–0.6)
EOSINOPHILS RELATIVE PERCENT: 0 %
GFR AFRICAN AMERICAN: >60
GFR NON-AFRICAN AMERICAN: >60
GLOBULIN: 3.9 G/DL
GLUCOSE BLD-MCNC: 114 MG/DL (ref 70–99)
GLUCOSE BLD-MCNC: 199 MG/DL (ref 70–99)
GLUCOSE BLD-MCNC: 232 MG/DL (ref 70–99)
GLUCOSE BLD-MCNC: 288 MG/DL (ref 70–99)
GLUCOSE BLD-MCNC: 98 MG/DL (ref 70–99)
HCT VFR BLD CALC: 35.2 % (ref 40.5–52.5)
HEMOGLOBIN: 12 G/DL (ref 13.5–17.5)
LYMPHOCYTES ABSOLUTE: 1 K/UL (ref 1–5.1)
LYMPHOCYTES RELATIVE PERCENT: 14.2 %
MCH RBC QN AUTO: 32.4 PG (ref 26–34)
MCHC RBC AUTO-ENTMCNC: 34 G/DL (ref 31–36)
MCV RBC AUTO: 95.3 FL (ref 80–100)
MONOCYTES ABSOLUTE: 0.9 K/UL (ref 0–1.3)
MONOCYTES RELATIVE PERCENT: 13.3 %
NEUTROPHILS ABSOLUTE: 4.8 K/UL (ref 1.7–7.7)
NEUTROPHILS RELATIVE PERCENT: 72.3 %
PDW BLD-RTO: 13.2 % (ref 12.4–15.4)
PERFORMED ON: ABNORMAL
PERFORMED ON: NORMAL
PLATELET # BLD: 252 K/UL (ref 135–450)
PMV BLD AUTO: 6.1 FL (ref 5–10.5)
POTASSIUM REFLEX MAGNESIUM: 4.4 MMOL/L (ref 3.5–5.1)
RBC # BLD: 3.69 M/UL (ref 4.2–5.9)
SARS-COV-2, PCR: DETECTED
SODIUM BLD-SCNC: 135 MMOL/L (ref 136–145)
TOTAL PROTEIN: 7 G/DL (ref 6.4–8.2)
WBC # BLD: 6.7 K/UL (ref 4–11)

## 2020-11-29 PROCEDURE — 80053 COMPREHEN METABOLIC PANEL: CPT

## 2020-11-29 PROCEDURE — 6370000000 HC RX 637 (ALT 250 FOR IP): Performed by: INTERNAL MEDICINE

## 2020-11-29 PROCEDURE — 2000000000 HC ICU R&B

## 2020-11-29 PROCEDURE — 36556 INSERT NON-TUNNEL CV CATH: CPT

## 2020-11-29 PROCEDURE — 99291 CRITICAL CARE FIRST HOUR: CPT | Performed by: INTERNAL MEDICINE

## 2020-11-29 PROCEDURE — 2580000003 HC RX 258: Performed by: INTERNAL MEDICINE

## 2020-11-29 PROCEDURE — 36415 COLL VENOUS BLD VENIPUNCTURE: CPT

## 2020-11-29 PROCEDURE — 2500000003 HC RX 250 WO HCPCS: Performed by: INTERNAL MEDICINE

## 2020-11-29 PROCEDURE — 2700000000 HC OXYGEN THERAPY PER DAY

## 2020-11-29 PROCEDURE — 6360000002 HC RX W HCPCS: Performed by: INTERNAL MEDICINE

## 2020-11-29 PROCEDURE — 94660 CPAP INITIATION&MGMT: CPT

## 2020-11-29 PROCEDURE — 85025 COMPLETE CBC W/AUTO DIFF WBC: CPT

## 2020-11-29 PROCEDURE — 94761 N-INVAS EAR/PLS OXIMETRY MLT: CPT

## 2020-11-29 RX ORDER — FUROSEMIDE 10 MG/ML
20 INJECTION INTRAMUSCULAR; INTRAVENOUS ONCE
Status: COMPLETED | OUTPATIENT
Start: 2020-11-29 | End: 2020-11-29

## 2020-11-29 RX ADMIN — Medication 5000 UNITS: at 08:53

## 2020-11-29 RX ADMIN — INSULIN LISPRO 9 UNITS: 100 INJECTION, SOLUTION INTRAVENOUS; SUBCUTANEOUS at 16:50

## 2020-11-29 RX ADMIN — LEVOTHYROXINE SODIUM 100 MCG: 100 TABLET ORAL at 08:53

## 2020-11-29 RX ADMIN — ENOXAPARIN SODIUM 40 MG: 40 INJECTION SUBCUTANEOUS at 08:53

## 2020-11-29 RX ADMIN — FUROSEMIDE 20 MG: 10 INJECTION, SOLUTION INTRAMUSCULAR; INTRAVENOUS at 12:31

## 2020-11-29 RX ADMIN — Medication 10 ML: at 08:53

## 2020-11-29 RX ADMIN — ENOXAPARIN SODIUM 40 MG: 40 INJECTION SUBCUTANEOUS at 20:47

## 2020-11-29 RX ADMIN — INSULIN LISPRO 3 UNITS: 100 INJECTION, SOLUTION INTRAVENOUS; SUBCUTANEOUS at 12:31

## 2020-11-29 RX ADMIN — INSULIN GLARGINE 15 UNITS: 100 INJECTION, SOLUTION SUBCUTANEOUS at 21:00

## 2020-11-29 RX ADMIN — REMDESIVIR 100 MG: 100 INJECTION, POWDER, LYOPHILIZED, FOR SOLUTION INTRAVENOUS at 10:14

## 2020-11-29 RX ADMIN — Medication 10 ML: at 20:50

## 2020-11-29 RX ADMIN — LOSARTAN POTASSIUM 50 MG: 25 TABLET, FILM COATED ORAL at 08:53

## 2020-11-29 RX ADMIN — INSULIN LISPRO 3 UNITS: 100 INJECTION, SOLUTION INTRAVENOUS; SUBCUTANEOUS at 21:00

## 2020-11-29 RX ADMIN — DEXAMETHASONE SODIUM PHOSPHATE 6 MG: 10 INJECTION, SOLUTION INTRAMUSCULAR; INTRAVENOUS at 08:53

## 2020-11-29 NOTE — PROGRESS NOTES
11/29/20 0412   NIV Type   Mode Bilevel   Settings/Measurements   IPAP 16 cmH20   CPAP/EPAP 8 cmH2O   Rate Ordered 12   Resp 20   FiO2  100 %   Vt Exhaled 792 mL   Comfort Level Good   SpO2 91

## 2020-11-29 NOTE — PROGRESS NOTES
Morning assessment complete. Patient currently awake in bed. Patient currently on AIRVO 60 L 100%. Patient A/O x 4. Patient's lung sounds diminished bilaterally. Heart rate sinus rhythm on bedside monitor. Bowel sounds present x 4. No edema noted. Scattered bruising seen. Peripheral IVs C/D/I and functioning properly. Patient assisted with repositioning, call light and personal belongings within reach.

## 2020-11-29 NOTE — PROGRESS NOTES
11/29/20 1106   Oxygen Therapy/Pulse Ox   O2 Therapy Oxygen humidified   O2 Device Heated high flow cannula   O2 Flow Rate (L/min) 60 L/min   FiO2  80 %   Resp 22   SpO2 97 %

## 2020-11-29 NOTE — PROGRESS NOTES
Reassessment complete. Patient currently awake in bed. Patient currently on AIRVO at 60 L 80%. Physical assessment unchanged from previous. Patient assisted with repositioning, no current needs voiced. Call light and personal belongings within reach.

## 2020-11-29 NOTE — PROGRESS NOTES
Pulmonary & Critical Care Medicine ICU Progress Note    CC: COVID 19 pneumonia    Events of Last 24 hours:   100% Fio2/40lpm - changed to Bipap overnightfor hypoxia while sleeping. Pt comfortable back on vapotherm now despite hypoxia with minimal movement. Invasive Lines: IV:     MV:  D     / / /FiO2 : 100 %  No results for input(s): PHART, WAV8SDS, PO2ART in the last 72 hours. IV:   dextrose       Vitals:  /72   Pulse 57   Temp 98.3 °F (36.8 °C) (Oral)   Resp 12   Ht 5' 9\" (1.753 m)   Wt 242 lb 14.4 oz (110.2 kg)   SpO2 99%   BMI 35.87 kg/m²   on vapotherm    Intake/Output Summary (Last 24 hours) at 11/29/2020 0931  Last data filed at 11/28/2020 2242  Gross per 24 hour   Intake 480 ml   Output 1350 ml   Net -870 ml     EXAM:  Constitutional: ill appearing. Eyes: PERRL. No sclera icterus. ENT: Normal Nose. Normal Tongue. Neck:  Trachea is midline. No thyroid tenderness. Respiratory: No accessory muscle usage. decreased breath sounds. No wheezes. No rales. No Rhonchi. Cardiovascular: Normal S1S2. No murmur. No digit cyanosis. No digit clubbing. No LE edema. GI: Non-tender. Non-distended. Normal bowel sounds. No masses. No umbilical hernia. Neuro: Alert. Follows commands. moves all extremities. Psych: No agitation, no anxiety.     Scheduled Meds:   insulin glargine  15 Units Subcutaneous BID    insulin lispro  0-18 Units Subcutaneous TID     insulin lispro  0-9 Units Subcutaneous Nightly    enoxaparin  40 mg Subcutaneous BID    vitamin D3  5,000 Units Oral Daily    levothyroxine  100 mcg Oral Daily    losartan  50 mg Oral Daily    sodium chloride flush  10 mL Intravenous 2 times per day    remdesivir IVPB  100 mg Intravenous Q24H    sodium chloride  20 mL Intravenous Once    dexamethasone  6 mg Intravenous Daily     PRN Meds:  sodium chloride flush, acetaminophen **OR** acetaminophen, polyethylene glycol, promethazine **OR** ondansetron, sodium chloride, glucose, dextrose, glucagon (rDNA), dextrose    Results:  CBC:   Recent Labs     11/28/20 0459 11/29/20  0529   WBC 7.7 6.7   HGB 12.7* 12.0*   HCT 39.1* 35.2*   .4* 95.3    252     BMP:   Recent Labs     11/28/20 0459 11/29/20 0528   * 135*   K 4.5 4.4    103   CO2 17* 25   BUN 24* 20   CREATININE 0.7* 0.6*     LIVER PROFILE:   Recent Labs     11/28/20 0459 11/29/20 0528   AST 30 27   ALT 26 27   BILITOT 0.4 0.5   ALKPHOS 90 40     Chest imaging was reviewed by me and showed CXR 11/26  Patchy airspace opacities suspicious for pneumonia.  An atypical or viral    pneumonia is suspected         ASSESSMENT:  · COVID 19 pneumonia (tested in [place] and also had + rapid on admission)  · Acute hypoxic respiratory failure  · HTN  · DM2     PLAN:  · Patient is on Vapotherm for life-threatening respiratory failure. Bipap while asleep. · Supplemental oxygen to maintain SaO2 >92%; wean as tolerated   · s/p 1 u CCP  · Remdesivir day #4; monitor LFT and renal  · Decadron day #4; monitor glucose  · Lasix 20mg IV x 1  · SSI/lantus  · Lovenox BID  · Total critical care time caring for this patient with life threatening, unstable organ failure, including direct patient contact, management of life support sysstems, review of data including imaging and labs, discussions with other team members and physicians at least 33 minutes so far today, excluding procedures.

## 2020-11-29 NOTE — PROGRESS NOTES
Reassessment complete. Patient awake in bed, remains on AIRVO. Physical assessment unchanged. Patient with desaturations when standing to use urinal, patient refused ramsay at this time. Patient educated that if saturations continue to drop when standing, ramsay might need placed. Patient verbalized understanding. Patient with no current needs, call light and personal belongings within reach.

## 2020-11-29 NOTE — PROGRESS NOTES
Shift handoff given; pt AxOx4; pt is on AirVO and tolerating fine; pt VSS. Pt has no complaints at this time. Writer will continue to monitor. 2200: Pt desats intot the high 80's when sleeping (pt does wear CPAP at home), once woke up sats go back up into the low 90's. Pt VSS. Pt has no complaints. 0000: Reassessment done; see flow sheet. Pt resting. 0200: Pt continues to desat into mid 80's while sleeping; once aroused oxygen sat increases high 80's/90. Pt is now on AIRVO at 100%/60L.   0300: Pt spoke to about being intubated d/t de-sating and being on 100%/60L. Pt stated he only wanted intubation for last resort. RN explained that he is very close to needing it and educated on his demand for oxygen; pt still insist that he only be intubated for last resort. Updated Dr. Florina Charles. 0430: Dr. Lynn Zapien assessed and applied bipap since pt desats when sleeping and wears cpap at home. Pt is tolerating well.  /72   Pulse 71   Temp 98.4 °F (36.9 °C) (Oral)   Resp 18   Ht 5' 9\" (1.753 m)   Wt 242 lb 14.4 oz (110.2 kg)   SpO2 93%   BMI 35.87 kg/m²

## 2020-11-29 NOTE — PROGRESS NOTES
Hospitalist Progress Note      PCP: No primary care provider on file. Date of Admission: 11/26/2020    Chief Complaint: SOB. The patient is a 77 y.o. male w hx of DMII, HTN, Hypothyroidism, who presents with SOB.      Pt reports he first started feeling ill 2 weeks ago. Him and his daughter went to get tested albeit he is unable to tell me where he was tested and what the result of the test was.      He was reportedly tested positive for COVID [place] [address] - I do not see the results in our system. Confirmed here on 11/27     He presented to the hospital with worsening dyspnea and cough. He was found to be profoundly hypoxic - on AIRVO. Subjective:   11/28  oxygenation worse. Vapotherm, to ICU       11/29  Remains on AirVO. Oxygenation challenging. Offered to intubate last night - he refused. This am he told me he is ok with intubation if needed, but seems to be holding O2 sats.        Medications:  Reviewed    Infusion Medications    dextrose       Scheduled Medications    insulin glargine  15 Units Subcutaneous BID    insulin lispro  0-18 Units Subcutaneous TID WC    insulin lispro  0-9 Units Subcutaneous Nightly    enoxaparin  40 mg Subcutaneous BID    vitamin D3  5,000 Units Oral Daily    levothyroxine  100 mcg Oral Daily    losartan  50 mg Oral Daily    sodium chloride flush  10 mL Intravenous 2 times per day    remdesivir IVPB  100 mg Intravenous Q24H    sodium chloride  20 mL Intravenous Once    dexamethasone  6 mg Intravenous Daily     PRN Meds: sodium chloride flush, acetaminophen **OR** acetaminophen, polyethylene glycol, promethazine **OR** ondansetron, sodium chloride, glucose, dextrose, glucagon (rDNA), dextrose      Intake/Output Summary (Last 24 hours) at 11/29/2020 1441  Last data filed at 11/29/2020 1230  Gross per 24 hour   Intake 780 ml   Output 1200 ml   Net -420 ml       Physical Exam Performed:    /64   Pulse 70   Temp 97.8 °F (36.6 °C) (Oral)   Resp 19 Ht 5' 9\" (1.753 m)   Wt 242 lb 14.4 oz (110.2 kg)   SpO2 91%   BMI 35.87 kg/m²     General appearance: No apparent distress appears stated age and cooperative. HEENT Normal cephalic, atraumatic without obvious deformity. Pupils equal, round, and reactive to light. Extra ocular muscles intact. Conjunctivae/corneas clear. Neck: Supple, No jugular venous distention/bruits. Trachea midline without thyromegaly or adenopathy with full range of motion. Lungs: Clear to auscultation, bilaterally without Rales/Wheezes/Rhonchi with good respiratory effort. Heart: Regular rate and rhythm with Normal S1/S2 without murmurs, rubs or gallops, point of maximum impulse non-displaced  Abdomen: Soft, non-tender or non-distended without rigidity or guarding and positive bowel sounds all four quadrants. Extremities: No clubbing, cyanosis, or edema bilaterally. Full range of motion without deformity and normal gait intact. Skin: Skin color, texture, turgor normal.  No rashes or lesions. Neurologic: Alert and oriented X 3, neurovascularly intact with sensory/motor intact upper extremities/lower extremities, bilaterally. Cranial nerves: II-XII intact, grossly non-focal.  Mental status: Alert, oriented, thought content appropriate. Capillary Refill: Acceptable  < 3 seconds  Peripheral Pulses: +3 Easily felt, not easily obliterated with pressure         Labs:   Recent Labs     11/28/20 0459 11/29/20 0529   WBC 7.7 6.7   HGB 12.7* 12.0*   HCT 39.1* 35.2*    252     Recent Labs     11/28/20 0459 11/29/20 0528   * 135*   K 4.5 4.4    103   CO2 17* 25   BUN 24* 20   CREATININE 0.7* 0.6*   CALCIUM 8.7 8.7     Recent Labs     11/28/20 0459 11/29/20 0528   AST 30 27   ALT 26 27   BILITOT 0.4 0.5   ALKPHOS 57 53     No results for input(s): INR in the last 72 hours. No results for input(s): Reyne Syriac in the last 72 hours.     Urinalysis:    No results found for: Indrakatelynn Laner, [address], BACTERIA, RBCUA, BLOODU, Martha Palacios    Radiology:  XR CHEST 1 VIEW   Final Result   Patchy airspace opacities suspicious for pneumonia. An atypical or viral   pneumonia is suspected. Assessment/Plan:    Active Hospital Problems    Diagnosis    Acute respiratory failure with hypoxia ([facility] [address] [J96.01]    Pneumonia due to COVID-19 virus [U07.1, J12.89]       Acute Hypox Resp Failure. Secondary to COVID PNA - positive 11/27. S/p convalescent plasma 11/26 1 unit  Lovenox 40BID. Remdesivir started 11/27. Dexamethasone started 11/27. VitD.         DMII with steroid hyperglycemia. Increase lantus to 15BID  ISS to high dose. CCD. BG much better.         Hypothyroid - resume synthroid.         HTN - resume losartan. Hold HCTZ with hyponatremia - hyponatremia resolved. .                  DVT Prophylaxis: lovenox BID  Diet: DIET CARB CONTROL;  Code Status: Full Code        Dispo - cont ICU.  May kolton intubation.         Mg Fuentes MD

## 2020-11-30 LAB
ALBUMIN SERPL-MCNC: 3.3 G/DL (ref 3.4–5)
ALP BLD-CCNC: 62 U/L (ref 40–129)
ALT SERPL-CCNC: 34 U/L (ref 10–40)
ANION GAP SERPL CALCULATED.3IONS-SCNC: 10 MMOL/L (ref 3–16)
AST SERPL-CCNC: 27 U/L (ref 15–37)
BASOPHILS ABSOLUTE: 0 K/UL (ref 0–0.2)
BASOPHILS RELATIVE PERCENT: 0.2 %
BILIRUB SERPL-MCNC: 0.6 MG/DL (ref 0–1)
BILIRUBIN DIRECT: <0.2 MG/DL (ref 0–0.3)
BILIRUBIN, INDIRECT: NORMAL MG/DL (ref 0–1)
BUN BLDV-MCNC: 21 MG/DL (ref 7–20)
CALCIUM SERPL-MCNC: 8.8 MG/DL (ref 8.3–10.6)
CHLORIDE BLD-SCNC: 99 MMOL/L (ref 99–110)
CO2: 25 MMOL/L (ref 21–32)
CREAT SERPL-MCNC: 0.7 MG/DL (ref 0.8–1.3)
EOSINOPHILS ABSOLUTE: 0 K/UL (ref 0–0.6)
EOSINOPHILS RELATIVE PERCENT: 0 %
GFR AFRICAN AMERICAN: >60
GFR NON-AFRICAN AMERICAN: >60
GLUCOSE BLD-MCNC: 123 MG/DL (ref 70–99)
GLUCOSE BLD-MCNC: 150 MG/DL (ref 70–99)
GLUCOSE BLD-MCNC: 221 MG/DL (ref 70–99)
GLUCOSE BLD-MCNC: 264 MG/DL (ref 70–99)
GLUCOSE BLD-MCNC: 295 MG/DL (ref 70–99)
HCT VFR BLD CALC: 37.4 % (ref 40.5–52.5)
HEMOGLOBIN: 12.8 G/DL (ref 13.5–17.5)
INR BLD: 1.46 (ref 0.86–1.14)
LYMPHOCYTES ABSOLUTE: 1.1 K/UL (ref 1–5.1)
LYMPHOCYTES RELATIVE PERCENT: 13.9 %
MCH RBC QN AUTO: 32.7 PG (ref 26–34)
MCHC RBC AUTO-ENTMCNC: 34.2 G/DL (ref 31–36)
MCV RBC AUTO: 95.7 FL (ref 80–100)
MONOCYTES ABSOLUTE: 0.9 K/UL (ref 0–1.3)
MONOCYTES RELATIVE PERCENT: 11.4 %
NEUTROPHILS ABSOLUTE: 6 K/UL (ref 1.7–7.7)
NEUTROPHILS RELATIVE PERCENT: 74.5 %
PDW BLD-RTO: 13.2 % (ref 12.4–15.4)
PERFORMED ON: ABNORMAL
PHOSPHORUS: 3.3 MG/DL (ref 2.5–4.9)
PLATELET # BLD: 245 K/UL (ref 135–450)
PMV BLD AUTO: 6.2 FL (ref 5–10.5)
POTASSIUM SERPL-SCNC: 4.2 MMOL/L (ref 3.5–5.1)
PROTHROMBIN TIME: 17 SEC (ref 10–13.2)
RBC # BLD: 3.91 M/UL (ref 4.2–5.9)
SODIUM BLD-SCNC: 134 MMOL/L (ref 136–145)
TOTAL PROTEIN: 7.2 G/DL (ref 6.4–8.2)
WBC # BLD: 8 K/UL (ref 4–11)

## 2020-11-30 PROCEDURE — 2700000000 HC OXYGEN THERAPY PER DAY

## 2020-11-30 PROCEDURE — 99291 CRITICAL CARE FIRST HOUR: CPT | Performed by: INTERNAL MEDICINE

## 2020-11-30 PROCEDURE — 85610 PROTHROMBIN TIME: CPT

## 2020-11-30 PROCEDURE — 6370000000 HC RX 637 (ALT 250 FOR IP): Performed by: INTERNAL MEDICINE

## 2020-11-30 PROCEDURE — 6360000002 HC RX W HCPCS: Performed by: INTERNAL MEDICINE

## 2020-11-30 PROCEDURE — 2580000003 HC RX 258: Performed by: INTERNAL MEDICINE

## 2020-11-30 PROCEDURE — 94761 N-INVAS EAR/PLS OXIMETRY MLT: CPT

## 2020-11-30 PROCEDURE — 80069 RENAL FUNCTION PANEL: CPT

## 2020-11-30 PROCEDURE — 80076 HEPATIC FUNCTION PANEL: CPT

## 2020-11-30 PROCEDURE — 85025 COMPLETE CBC W/AUTO DIFF WBC: CPT

## 2020-11-30 PROCEDURE — 94660 CPAP INITIATION&MGMT: CPT

## 2020-11-30 PROCEDURE — 36415 COLL VENOUS BLD VENIPUNCTURE: CPT

## 2020-11-30 PROCEDURE — 99233 SBSQ HOSP IP/OBS HIGH 50: CPT | Performed by: INTERNAL MEDICINE

## 2020-11-30 PROCEDURE — 2000000000 HC ICU R&B

## 2020-11-30 PROCEDURE — 2500000003 HC RX 250 WO HCPCS: Performed by: INTERNAL MEDICINE

## 2020-11-30 RX ORDER — FUROSEMIDE 10 MG/ML
40 INJECTION INTRAMUSCULAR; INTRAVENOUS ONCE
Status: COMPLETED | OUTPATIENT
Start: 2020-11-30 | End: 2020-11-30

## 2020-11-30 RX ORDER — SENNA AND DOCUSATE SODIUM 50; 8.6 MG/1; MG/1
2 TABLET, FILM COATED ORAL 2 TIMES DAILY
Status: DISCONTINUED | OUTPATIENT
Start: 2020-11-30 | End: 2020-12-03

## 2020-11-30 RX ORDER — LACTULOSE 10 G/15ML
20 SOLUTION ORAL ONCE
Status: COMPLETED | OUTPATIENT
Start: 2020-11-30 | End: 2020-11-30

## 2020-11-30 RX ADMIN — ENOXAPARIN SODIUM 40 MG: 40 INJECTION SUBCUTANEOUS at 20:43

## 2020-11-30 RX ADMIN — LEVOTHYROXINE SODIUM 100 MCG: 100 TABLET ORAL at 08:20

## 2020-11-30 RX ADMIN — INSULIN LISPRO 9 UNITS: 100 INJECTION, SOLUTION INTRAVENOUS; SUBCUTANEOUS at 17:10

## 2020-11-30 RX ADMIN — LACTULOSE 20 G: 10 SOLUTION ORAL at 17:09

## 2020-11-30 RX ADMIN — FUROSEMIDE 40 MG: 10 INJECTION, SOLUTION INTRAMUSCULAR; INTRAVENOUS at 12:19

## 2020-11-30 RX ADMIN — Medication 10 ML: at 21:00

## 2020-11-30 RX ADMIN — Medication 5000 UNITS: at 08:20

## 2020-11-30 RX ADMIN — REMDESIVIR 100 MG: 100 INJECTION, POWDER, LYOPHILIZED, FOR SOLUTION INTRAVENOUS at 09:04

## 2020-11-30 RX ADMIN — LOSARTAN POTASSIUM 50 MG: 25 TABLET, FILM COATED ORAL at 08:19

## 2020-11-30 RX ADMIN — DEXAMETHASONE SODIUM PHOSPHATE 6 MG: 10 INJECTION, SOLUTION INTRAMUSCULAR; INTRAVENOUS at 08:20

## 2020-11-30 RX ADMIN — INSULIN GLARGINE 15 UNITS: 100 INJECTION, SOLUTION SUBCUTANEOUS at 21:39

## 2020-11-30 RX ADMIN — ENOXAPARIN SODIUM 40 MG: 40 INJECTION SUBCUTANEOUS at 08:21

## 2020-11-30 RX ADMIN — INSULIN LISPRO 6 UNITS: 100 INJECTION, SOLUTION INTRAVENOUS; SUBCUTANEOUS at 12:19

## 2020-11-30 RX ADMIN — Medication 10 ML: at 08:20

## 2020-11-30 RX ADMIN — DOCUSATE SODIUM 50MG AND SENNOSIDES 8.6MG 2 TABLET: 8.6; 5 TABLET, FILM COATED ORAL at 20:43

## 2020-11-30 RX ADMIN — INSULIN LISPRO 5 UNITS: 100 INJECTION, SOLUTION INTRAVENOUS; SUBCUTANEOUS at 21:00

## 2020-11-30 ASSESSMENT — PAIN SCALES - GENERAL
PAINLEVEL_OUTOF10: 0
PAINLEVEL_OUTOF10: 0

## 2020-11-30 NOTE — PROGRESS NOTES
Reassessment complete. Patient currently awake in chair. Patient currently on AIRVO at 60 L 55%. Physical assessment unchanged from previous. Patient voices no needs, call light and personal belongings within reach.

## 2020-11-30 NOTE — PROGRESS NOTES
Reassessment complete. Patient currently awake in chair. Patient remains on AIRVO at 50 L 50%. Physical assessment unchanged. Patient voices no current needs.

## 2020-11-30 NOTE — PROGRESS NOTES
11/30/20 0334   NIV Type   Equipment Type V60   Mode Bilevel   Mask Type Full face mask   Mask Size Large   Settings/Measurements   IPAP 16 cmH20   CPAP/EPAP 8 cmH2O   Rate Ordered 12   Resp 20   FiO2  100 %   Vt Exhaled 715 mL   Mask Leak (lpm) 34 lpm   Comfort Level Good   Using Accessory Muscles No   SpO2 95   Alarm Settings   Alarms On Y   Oxygen Therapy/Pulse Ox   SpO2 95 %

## 2020-11-30 NOTE — PROGRESS NOTES
Hospitalist Progress Note      PCP: No primary care provider on file. Date of Admission: 11/26/2020    Chief Complaint: SOB. The patient is a 77 y.o. male w hx of DMII, HTN, Hypothyroidism, who presents with SOB.      Pt reports he first started feeling ill 2 weeks ago. Him and his daughter went to get tested albeit he is unable to tell me where he was tested and what the result of the test was.      He was reportedly tested positive for COVID in [place] - I do not see the results in our system. Confirmed here on 11/27     He presented to the hospital with worsening dyspnea and cough. He was found to be profoundly hypoxic - on AIRVO. 11/28  oxygenation worse. Vapotherm, to ICU   Subjective:       11/30  Remains on AirVO.    Seen sitting up in chair, feels better today and used bipap last night  Watching ball game today       Medications:  Reviewed    Infusion Medications    dextrose       Scheduled Medications    sennosides-docusate sodium  2 tablet Oral BID    insulin glargine  15 Units Subcutaneous BID    insulin lispro  0-18 Units Subcutaneous TID WC    insulin lispro  0-9 Units Subcutaneous Nightly    enoxaparin  40 mg Subcutaneous BID    vitamin D3  5,000 Units Oral Daily    levothyroxine  100 mcg Oral Daily    losartan  50 mg Oral Daily    sodium chloride flush  10 mL Intravenous 2 times per day    sodium chloride  20 mL Intravenous Once    dexamethasone  6 mg Intravenous Daily     PRN Meds: sodium chloride flush, acetaminophen **OR** acetaminophen, polyethylene glycol, promethazine **OR** ondansetron, sodium chloride, glucose, dextrose, glucagon (rDNA), dextrose      Intake/Output Summary (Last 24 hours) at 11/30/2020 1346  Last data filed at 11/30/2020 0824  Gross per 24 hour   Intake --   Output 1550 ml   Net -1550 ml       Physical Exam Performed:    /72   Pulse 67   Temp 97.6 °F (36.4 °C)   Resp 17   Ht 5' 9\" (1.753 m)   Wt 242 lb 14.4 oz (110.2 kg)   SpO2 92%   BMI 35.87 kg/m²         General:  eldelry male up in chair  Awake, alert and oriented. Appears to be not in any distress  Mucous Membranes:  Pink , anicteric  Neck: No JVD, no carotid bruit, no thyromegaly  Chest:  Clear to auscultation bilaterally, diminished in bases with occasional crackles  Cardiovascular:  RRR S1S2 heard, no murmurs or gallops  Abdomen:  Soft, undistended, non tender, no organomegaly, BS present  Extremities: No edema or cyanosis. Distal pulses well felt  Neurological : grossly normal        Labs:   Recent Labs     11/28/20 0459 11/29/20  0529 11/30/20  0501   WBC 7.7 6.7 8.0   HGB 12.7* 12.0* 12.8*   HCT 39.1* 35.2* 37.4*    252 245     Recent Labs     11/28/20 0459 11/29/20  0528 11/30/20  0501   * 135* 134*   K 4.5 4.4 4.2    103 99   CO2 17* 25 25   BUN 24* 20 21*   CREATININE 0.7* 0.6* 0.7*   CALCIUM 8.7 8.7 8.8   PHOS  --   --  3.3     Recent Labs     11/28/20 0459 11/29/20  0528 11/30/20  0501   AST 30 27 27   ALT 26 27 34   BILIDIR  --   --  <0.2   BILITOT 0.4 0.5 0.6   ALKPHOS 57 53 62     Recent Labs     11/30/20  0501   INR 1.46*     No results for input(s): CKTOTAL, TROPONINI in the last 72 hours. Urinalysis:    No results found for: [place], BACTERIA, RBCUA, BLOODU, [address]    Radiology:  XR CHEST 1 VIEW   Final Result   Patchy airspace opacities suspicious for pneumonia. An atypical or viral   pneumonia is suspected. Assessment/Plan:    Active Hospital Problems    Diagnosis    Acute respiratory failure with hypoxia ([facility] [address] [J96.01]    Pneumonia due to COVID-19 virus [U07.1, J12.89]       Acute Hypox Resp Failure. Secondary to COVID PNA - positive 11/27. S/p convalescent plasma 11/26 1 unit  Lovenox 40BID. Remdesivir started 11/27. Dexamethasone started 11/27. remains on high flow oxygen in ICU    stable status today . Pt ok for intubation if needed         DMII with steroid hyperglycemia.    Increase lantus to 15BID  ISS to high dose. CCD. BG much better.         Hypothyroid - resumed synthroid.         HTN - resume losartan. Hold HCTZ with hyponatremia - hyponatremia resolved.  .              DVT Prophylaxis: lovenox BID  Diet: DIET CARB CONTROL;  Code Status: Full Code        Loraine Mcmanus MD 11/30/2020 1:46 PM

## 2020-11-30 NOTE — PROGRESS NOTES
Pulmonary & Critical Care Medicine ICU Progress Note    CC: COVID 19 pneumonia    Events of Last 24 hours:   Patient remains on high flow oxygen. Invasive Lines: IV:     MV:  D     / / /FiO2 : 100 %  No results for input(s): PHART, RGH3QOJ, PO2ART in the last 72 hours. IV:   dextrose       Vitals:  /65   Pulse 79   Temp 98.2 °F (36.8 °C) (Oral)   Resp 15   Ht 5' 9\" (1.753 m)   Wt 242 lb 14.4 oz (110.2 kg)   SpO2 92%   BMI 35.87 kg/m²   on airvo 80 60L    Intake/Output Summary (Last 24 hours) at 11/30/2020 0467  Last data filed at 11/29/2020 1459  Gross per 24 hour   Intake 480 ml   Output 1300 ml   Net -820 ml     EXAM:  Constitutional: ill appearing. NCAT  Eyes: PERRL. No sclera icterus. ENT: Normal Nose. Normal Tongue. Neck:  Trachea is midline. No thyroid tenderness. Respiratory: No accessory muscle usage. No increase in resp distress  Cardiovascular: No JVD, No LE edema. GI: Non-distended. .  Neuro: Alert. Follows commands. moves all extremities.     Scheduled Meds:   insulin glargine  15 Units Subcutaneous BID    insulin lispro  0-18 Units Subcutaneous TID WC    insulin lispro  0-9 Units Subcutaneous Nightly    enoxaparin  40 mg Subcutaneous BID    vitamin D3  5,000 Units Oral Daily    levothyroxine  100 mcg Oral Daily    losartan  50 mg Oral Daily    sodium chloride flush  10 mL Intravenous 2 times per day    remdesivir IVPB  100 mg Intravenous Q24H    sodium chloride  20 mL Intravenous Once    dexamethasone  6 mg Intravenous Daily     PRN Meds:  sodium chloride flush, acetaminophen **OR** acetaminophen, polyethylene glycol, promethazine **OR** ondansetron, sodium chloride, glucose, dextrose, glucagon (rDNA), dextrose    Results:  CBC:   Recent Labs     11/28/20 0459 11/29/20 0529 11/30/20  0501   WBC 7.7 6.7 8.0   HGB 12.7* 12.0* 12.8*   HCT 39.1* 35.2* 37.4*   .4* 95.3 95.7    252 245     BMP:   Recent Labs     11/28/20 0459 11/29/20 0528 11/30/20  0501   * 135* 134*   K 4.5 4.4 4.2    103 99   CO2 17* 25 25   PHOS  --   --  3.3   BUN 24* 20 21*   CREATININE 0.7* 0.6* 0.7*     LIVER PROFILE:   Recent Labs     11/28/20  0459 11/29/20  0528 11/30/20  0501   AST 30 27 27   ALT 26 27 34   BILIDIR  --   --  <0.2   BILITOT 0.4 0.5 0.6   ALKPHOS 57 53 62       Micro:  11/27 sars Cov2- detected on pcr         Chest imaging was reviewed by me and showed CXR 11/26  Patchy airspace opacities suspicious for pneumonia.  An atypical or viral    pneumonia is suspected             ASSESSMENT:  · COVID 19 pneumonia (tested in [place] and also had + rapid on admission)  · Acute hypoxic respiratory failure  · HTN  · DM2     PLAN:  · Patient is on Vapotherm for life-threatening respiratory failure to keep Sao2 >90%. Bipap while asleep. · Supplemental oxygen to maintain SaO2 >92%; wean as tolerated   · s/p 1 u CCP  · Remdesivir day #5; monitor LFT and renal  · Decadron day #5; monitor glucose  · Lasix 40mg IV x 1  · SSI/lantus  · Lovenox BID  · Full code  · Senna - S      Patient is critically ill with acute respiratory failure. Critical care time spent reviewing labs/films, examining patient, collaborating with other physicians but excluding procedures for life threatening organ failure is 31  minutes.

## 2020-11-30 NOTE — PROGRESS NOTES
Morning assessment complete. Patient currently awake in bed eating breakfast. Patient A/O x 4. Patient currently on AIRVO 60 L 80%. Lung sounds diminished bilaterally. Heart rate sinus rhythm on bedside monitor, rates 80-90s. Bowel sounds present x 4. No edema noted. Scattered bruising. Peripheral IVs C/D/I and functioning properly. Patient with no current needs voiced, call light and personal belongings within reach. Patient educated on use of call light and to call out with needs, verbalized understanding.

## 2020-12-01 LAB
ANION GAP SERPL CALCULATED.3IONS-SCNC: 10 MMOL/L (ref 3–16)
BASOPHILS ABSOLUTE: 0 K/UL (ref 0–0.2)
BASOPHILS RELATIVE PERCENT: 0.2 %
BUN BLDV-MCNC: 19 MG/DL (ref 7–20)
CALCIUM SERPL-MCNC: 8.9 MG/DL (ref 8.3–10.6)
CHLORIDE BLD-SCNC: 96 MMOL/L (ref 99–110)
CO2: 23 MMOL/L (ref 21–32)
CREAT SERPL-MCNC: 0.6 MG/DL (ref 0.8–1.3)
EOSINOPHILS ABSOLUTE: 0 K/UL (ref 0–0.6)
EOSINOPHILS RELATIVE PERCENT: 0.2 %
GFR AFRICAN AMERICAN: >60
GFR NON-AFRICAN AMERICAN: >60
GLUCOSE BLD-MCNC: 131 MG/DL (ref 70–99)
GLUCOSE BLD-MCNC: 152 MG/DL (ref 70–99)
GLUCOSE BLD-MCNC: 275 MG/DL (ref 70–99)
GLUCOSE BLD-MCNC: 299 MG/DL (ref 70–99)
GLUCOSE BLD-MCNC: 346 MG/DL (ref 70–99)
HCT VFR BLD CALC: 40.1 % (ref 40.5–52.5)
HEMOGLOBIN: 13.5 G/DL (ref 13.5–17.5)
LYMPHOCYTES ABSOLUTE: 1.1 K/UL (ref 1–5.1)
LYMPHOCYTES RELATIVE PERCENT: 10.6 %
MCH RBC QN AUTO: 32.5 PG (ref 26–34)
MCHC RBC AUTO-ENTMCNC: 33.6 G/DL (ref 31–36)
MCV RBC AUTO: 96.7 FL (ref 80–100)
MONOCYTES ABSOLUTE: 1 K/UL (ref 0–1.3)
MONOCYTES RELATIVE PERCENT: 10 %
NEUTROPHILS ABSOLUTE: 8.1 K/UL (ref 1.7–7.7)
NEUTROPHILS RELATIVE PERCENT: 79 %
PDW BLD-RTO: 13.4 % (ref 12.4–15.4)
PERFORMED ON: ABNORMAL
PLATELET # BLD: 258 K/UL (ref 135–450)
PMV BLD AUTO: 6.4 FL (ref 5–10.5)
POTASSIUM SERPL-SCNC: 4.2 MMOL/L (ref 3.5–5.1)
RBC # BLD: 4.15 M/UL (ref 4.2–5.9)
SODIUM BLD-SCNC: 129 MMOL/L (ref 136–145)
WBC # BLD: 10.2 K/UL (ref 4–11)

## 2020-12-01 PROCEDURE — 6360000002 HC RX W HCPCS: Performed by: INTERNAL MEDICINE

## 2020-12-01 PROCEDURE — 94761 N-INVAS EAR/PLS OXIMETRY MLT: CPT

## 2020-12-01 PROCEDURE — 99232 SBSQ HOSP IP/OBS MODERATE 35: CPT | Performed by: INTERNAL MEDICINE

## 2020-12-01 PROCEDURE — 2000000000 HC ICU R&B

## 2020-12-01 PROCEDURE — 6370000000 HC RX 637 (ALT 250 FOR IP): Performed by: INTERNAL MEDICINE

## 2020-12-01 PROCEDURE — 94660 CPAP INITIATION&MGMT: CPT

## 2020-12-01 PROCEDURE — 36415 COLL VENOUS BLD VENIPUNCTURE: CPT

## 2020-12-01 PROCEDURE — 2700000000 HC OXYGEN THERAPY PER DAY

## 2020-12-01 PROCEDURE — 99291 CRITICAL CARE FIRST HOUR: CPT | Performed by: INTERNAL MEDICINE

## 2020-12-01 PROCEDURE — 85025 COMPLETE CBC W/AUTO DIFF WBC: CPT

## 2020-12-01 PROCEDURE — 2580000003 HC RX 258: Performed by: INTERNAL MEDICINE

## 2020-12-01 PROCEDURE — 80048 BASIC METABOLIC PNL TOTAL CA: CPT

## 2020-12-01 RX ORDER — INSULIN GLARGINE 100 [IU]/ML
22 INJECTION, SOLUTION SUBCUTANEOUS 2 TIMES DAILY
Status: DISCONTINUED | OUTPATIENT
Start: 2020-12-01 | End: 2020-12-08 | Stop reason: HOSPADM

## 2020-12-01 RX ORDER — FUROSEMIDE 10 MG/ML
40 INJECTION INTRAMUSCULAR; INTRAVENOUS ONCE
Status: COMPLETED | OUTPATIENT
Start: 2020-12-01 | End: 2020-12-01

## 2020-12-01 RX ADMIN — INSULIN GLARGINE 22 UNITS: 100 INJECTION, SOLUTION SUBCUTANEOUS at 20:06

## 2020-12-01 RX ADMIN — DEXAMETHASONE SODIUM PHOSPHATE 6 MG: 10 INJECTION, SOLUTION INTRAMUSCULAR; INTRAVENOUS at 08:30

## 2020-12-01 RX ADMIN — ENOXAPARIN SODIUM 40 MG: 40 INJECTION SUBCUTANEOUS at 08:31

## 2020-12-01 RX ADMIN — DOCUSATE SODIUM 50MG AND SENNOSIDES 8.6MG 2 TABLET: 8.6; 5 TABLET, FILM COATED ORAL at 08:30

## 2020-12-01 RX ADMIN — INSULIN LISPRO 12 UNITS: 100 INJECTION, SOLUTION INTRAVENOUS; SUBCUTANEOUS at 17:03

## 2020-12-01 RX ADMIN — Medication 10 ML: at 08:31

## 2020-12-01 RX ADMIN — LOSARTAN POTASSIUM 50 MG: 25 TABLET, FILM COATED ORAL at 08:30

## 2020-12-01 RX ADMIN — FUROSEMIDE 40 MG: 10 INJECTION, SOLUTION INTRAMUSCULAR; INTRAVENOUS at 12:47

## 2020-12-01 RX ADMIN — INSULIN GLARGINE 15 UNITS: 100 INJECTION, SOLUTION SUBCUTANEOUS at 08:31

## 2020-12-01 RX ADMIN — LEVOTHYROXINE SODIUM 100 MCG: 100 TABLET ORAL at 08:31

## 2020-12-01 RX ADMIN — Medication 5000 UNITS: at 08:30

## 2020-12-01 RX ADMIN — ENOXAPARIN SODIUM 40 MG: 40 INJECTION SUBCUTANEOUS at 20:05

## 2020-12-01 RX ADMIN — INSULIN LISPRO 9 UNITS: 100 INJECTION, SOLUTION INTRAVENOUS; SUBCUTANEOUS at 12:47

## 2020-12-01 RX ADMIN — Medication 10 ML: at 20:05

## 2020-12-01 RX ADMIN — DOCUSATE SODIUM 50MG AND SENNOSIDES 8.6MG 2 TABLET: 8.6; 5 TABLET, FILM COATED ORAL at 20:05

## 2020-12-01 RX ADMIN — INSULIN LISPRO 5 UNITS: 100 INJECTION, SOLUTION INTRAVENOUS; SUBCUTANEOUS at 20:05

## 2020-12-01 ASSESSMENT — PAIN SCALES - GENERAL: PAINLEVEL_OUTOF10: 0

## 2020-12-01 NOTE — PROGRESS NOTES
Pulmonary & Critical Care Medicine ICU Progress Note    CC: COVID 19 pneumonia    Events of Last 24 hours:   Patient with slightly better oxygenation. Patient had BM. Invasive Lines: IV:     MV:      / / /FiO2 : 45 %  No results for input(s): PHART, [id number], PO2ART in the last 72 hours. IV:   dextrose       Vitals:  /66   Pulse 68   Temp 98.3 °F (36.8 °C) (Axillary)   Resp 20   Ht 5' 9\" (1.753 m)   Wt 242 lb 14.4 oz (110.2 kg)   SpO2 92%   BMI 35.87 kg/m²   on airvo 70 % 60L-> 45% 50L    Intake/Output Summary (Last 24 hours) at 12/1/2020 0848  Last data filed at 12/1/2020 0119  Gross per 24 hour   Intake 600 ml   Output 1950 ml   Net -1350 ml     EXAM:  Constitutional: ill appearing. NCAT  Eyes: PERRL. No sclera icterus. ENT: Normal Nose. Normal Tongue. Neck:  Trachea is midline. No thyroid tenderness. Respiratory: No accessory muscle usage. No increase in resp distress  Cardiovascular: No JVD, No LE edema. GI: Non-distended no hernia  Neuro: Alert. Follows commands. moves all extremities.     Scheduled Meds:   sennosides-docusate sodium  2 tablet Oral BID    insulin glargine  15 Units Subcutaneous BID    insulin lispro  0-18 Units Subcutaneous TID WC    insulin lispro  0-9 Units Subcutaneous Nightly    enoxaparin  40 mg Subcutaneous BID    vitamin D3  5,000 Units Oral Daily    levothyroxine  100 mcg Oral Daily    losartan  50 mg Oral Daily    sodium chloride flush  10 mL Intravenous 2 times per day    sodium chloride  20 mL Intravenous Once    dexamethasone  6 mg Intravenous Daily     PRN Meds:  sodium chloride flush, acetaminophen **OR** acetaminophen, polyethylene glycol, promethazine **OR** ondansetron, sodium chloride, glucose, dextrose, glucagon (rDNA), dextrose    Results:  CBC:   Recent Labs     11/29/20  0529 11/30/20  0501 12/01/20  0509   WBC 6.7 8.0 10.2   HGB 12.0* 12.8* 13.5   HCT 35.2* 37.4* 40.1*   MCV 95.3 95.7 96.7    245 258     BMP:   Recent Labs

## 2020-12-01 NOTE — PROGRESS NOTES
11/30/20 2308   NIV Type   Mode Bilevel   Mask Type Full face mask   Mask Size Large   Settings/Measurements   IPAP 16 cmH20   CPAP/EPAP 8 cmH2O   Rate Ordered 12   Resp 18   FiO2  70 %   Vt Exhaled 853 mL   Comfort Level Good   SpO2 95

## 2020-12-01 NOTE — PROGRESS NOTES
12/01/20 0354   NIV Type   Mode Bilevel   Mask Type Full face mask   Mask Size Large   Settings/Measurements   IPAP 16 cmH20   CPAP/EPAP 8 cmH2O   Rate Ordered 12   Resp 18   FiO2  70 %   Vt Exhaled 785 mL   Comfort Level Good   SpO2 90

## 2020-12-01 NOTE — PROGRESS NOTES
Hospitalist Progress Note      PCP: No primary care provider on file. Date of Admission: 11/26/2020    Chief Complaint: SOB. The patient is a 77 y.o. male w hx of DMII, HTN, Hypothyroidism, who presents with SOB.      Pt reports he first started feeling ill 2 weeks ago. Him and his daughter went to get tested albeit he is unable to tell me where he was tested and what the result of the test was.      He was reportedly tested positive for COVID in [place] - I do not see the results in our system. Confirmed here on 11/27     He presented to the hospital with worsening dyspnea and cough. He was found to be profoundly hypoxic - on AIRVO. 11/28  oxygenation worse. Vapotherm, to ICU   Subjective:       11/30  Remains on AirVO. Seen sitting up in chair, feels better today and used bipap last night  Watching ball game today     12/1  - pt not seen at bedside.  Up in room , sitting in chair again today   Denies any issues  Comfortable on AirVo vapotherm      Medications:  Reviewed    Infusion Medications    dextrose       Scheduled Medications    sennosides-docusate sodium  2 tablet Oral BID    insulin glargine  15 Units Subcutaneous BID    insulin lispro  0-18 Units Subcutaneous TID WC    insulin lispro  0-9 Units Subcutaneous Nightly    enoxaparin  40 mg Subcutaneous BID    vitamin D3  5,000 Units Oral Daily    levothyroxine  100 mcg Oral Daily    losartan  50 mg Oral Daily    sodium chloride flush  10 mL Intravenous 2 times per day    sodium chloride  20 mL Intravenous Once    dexamethasone  6 mg Intravenous Daily     PRN Meds: sodium chloride flush, acetaminophen **OR** acetaminophen, polyethylene glycol, promethazine **OR** ondansetron, sodium chloride, glucose, dextrose, glucagon (rDNA), dextrose      Intake/Output Summary (Last 24 hours) at 12/1/2020 0730  Last data filed at 12/1/2020 9162  Gross per 24 hour   Intake 780 ml   Output 2500 ml   Net -1720 ml       Physical Exam Performed:    BP 115/66   Pulse 68   Temp 98.3 °F (36.8 °C) (Oral)   Resp 20   Ht 5' 9\" (1.753 m)   Wt 242 lb 14.4 oz (110.2 kg)   SpO2 92%   BMI 35.87 kg/m²     Pt not seen at bedside    General:  eldelry male up in chair  Awake, alert and oriented. Appears to be not in any distress  Neurological : grossly normal        Labs:   Recent Labs     11/29/20  0529 11/30/20  0501 12/01/20  0509   WBC 6.7 8.0 10.2   HGB 12.0* 12.8* 13.5   HCT 35.2* 37.4* 40.1*    245 258     Recent Labs     11/29/20  0528 11/30/20  0501   * 134*   K 4.4 4.2    99   CO2 25 25   BUN 20 21*   CREATININE 0.6* 0.7*   CALCIUM 8.7 8.8   PHOS  --  3.3     Recent Labs     11/29/20  0528 11/30/20  0501   AST 27 27   ALT 27 34   BILIDIR  --  <0.2   BILITOT 0.5 0.6   ALKPHOS 53 62     Recent Labs     11/30/20  0501   INR 1.46*     No results for input(s): [place] in the last 72 hours. Urinalysis:    No results found for: [employer], BACTERIA, RBCUA, BLOODU, [address], [address]    Radiology:  XR CHEST 1 VIEW   Final Result   Patchy airspace opacities suspicious for pneumonia. An atypical or viral   pneumonia is suspected. Assessment/Plan:    Active Hospital Problems    Diagnosis    Steroid-induced hyperglycemia [R73.9, T38.0X5A]    Acute respiratory failure with hypoxia (HCC) [J96.01]    Pneumonia due to COVID-19 virus [U07.1, J12.89]       Acute Hypox Resp Failure. Secondary to COVID PNA - positive 11/27. S/p convalescent plasma 11/26 1 unit  Lovenox 40BID. Remdesivir started 11/27. Dexamethasone started 11/27. remains on high flow oxygen in ICU    stable status today . Pt ok for intubation if needed         DMII with steroid hyperglycemia. Increase lantus to 20 BID  ISS to high dose.          Hypothyroid - resumed synthroid.         HTN - resumed losartan. Hold HCTZ with hyponatremia - hyponatremia improved  .  .              DVT Prophylaxis: lovenox BID  Diet: DIET CARB CONTROL;  Code

## 2020-12-01 NOTE — PROGRESS NOTES
Assessment done; see flow chart. Pt in chair at this time remains on AIRVO at 50L/50%. Pt has no complaints at this time. Writer will continue to monitor.  BP (!) 107/59   Pulse 53   Temp 98.3 °F (36.8 °C) (Oral)   Resp 18   Ht 5' 9\" (1.753 m)   Wt 242 lb 14.4 oz (110.2 kg)   SpO2 95%   BMI 35.87 kg/m²

## 2020-12-02 LAB
ANION GAP SERPL CALCULATED.3IONS-SCNC: 8 MMOL/L (ref 3–16)
BASOPHILS ABSOLUTE: 0 K/UL (ref 0–0.2)
BASOPHILS RELATIVE PERCENT: 0.4 %
BUN BLDV-MCNC: 19 MG/DL (ref 7–20)
CALCIUM SERPL-MCNC: 8.8 MG/DL (ref 8.3–10.6)
CHLORIDE BLD-SCNC: 97 MMOL/L (ref 99–110)
CO2: 26 MMOL/L (ref 21–32)
CREAT SERPL-MCNC: 0.7 MG/DL (ref 0.8–1.3)
EOSINOPHILS ABSOLUTE: 0 K/UL (ref 0–0.6)
EOSINOPHILS RELATIVE PERCENT: 0.4 %
GFR AFRICAN AMERICAN: >60
GFR NON-AFRICAN AMERICAN: >60
GLUCOSE BLD-MCNC: 115 MG/DL (ref 70–99)
GLUCOSE BLD-MCNC: 144 MG/DL (ref 70–99)
GLUCOSE BLD-MCNC: 156 MG/DL (ref 70–99)
GLUCOSE BLD-MCNC: 216 MG/DL (ref 70–99)
GLUCOSE BLD-MCNC: 308 MG/DL (ref 70–99)
GLUCOSE BLD-MCNC: 326 MG/DL (ref 70–99)
HCT VFR BLD CALC: 38 % (ref 40.5–52.5)
HEMOGLOBIN: 13.1 G/DL (ref 13.5–17.5)
LYMPHOCYTES ABSOLUTE: 0.9 K/UL (ref 1–5.1)
LYMPHOCYTES RELATIVE PERCENT: 8.8 %
MCH RBC QN AUTO: 32.7 PG (ref 26–34)
MCHC RBC AUTO-ENTMCNC: 34.5 G/DL (ref 31–36)
MCV RBC AUTO: 94.9 FL (ref 80–100)
MONOCYTES ABSOLUTE: 1 K/UL (ref 0–1.3)
MONOCYTES RELATIVE PERCENT: 9.3 %
NEUTROPHILS ABSOLUTE: 8.6 K/UL (ref 1.7–7.7)
NEUTROPHILS RELATIVE PERCENT: 81.1 %
PDW BLD-RTO: 13.4 % (ref 12.4–15.4)
PERFORMED ON: ABNORMAL
PLATELET # BLD: 280 K/UL (ref 135–450)
PMV BLD AUTO: 6.7 FL (ref 5–10.5)
POTASSIUM SERPL-SCNC: 4.1 MMOL/L (ref 3.5–5.1)
RBC # BLD: 4 M/UL (ref 4.2–5.9)
SODIUM BLD-SCNC: 131 MMOL/L (ref 136–145)
WBC # BLD: 10.6 K/UL (ref 4–11)

## 2020-12-02 PROCEDURE — 2700000000 HC OXYGEN THERAPY PER DAY

## 2020-12-02 PROCEDURE — 6360000002 HC RX W HCPCS: Performed by: INTERNAL MEDICINE

## 2020-12-02 PROCEDURE — 6370000000 HC RX 637 (ALT 250 FOR IP): Performed by: INTERNAL MEDICINE

## 2020-12-02 PROCEDURE — 85025 COMPLETE CBC W/AUTO DIFF WBC: CPT

## 2020-12-02 PROCEDURE — 99291 CRITICAL CARE FIRST HOUR: CPT | Performed by: INTERNAL MEDICINE

## 2020-12-02 PROCEDURE — 94761 N-INVAS EAR/PLS OXIMETRY MLT: CPT

## 2020-12-02 PROCEDURE — 80048 BASIC METABOLIC PNL TOTAL CA: CPT

## 2020-12-02 PROCEDURE — 36415 COLL VENOUS BLD VENIPUNCTURE: CPT

## 2020-12-02 PROCEDURE — 2000000000 HC ICU R&B

## 2020-12-02 PROCEDURE — 2580000003 HC RX 258: Performed by: INTERNAL MEDICINE

## 2020-12-02 PROCEDURE — 94660 CPAP INITIATION&MGMT: CPT

## 2020-12-02 PROCEDURE — 99232 SBSQ HOSP IP/OBS MODERATE 35: CPT | Performed by: INTERNAL MEDICINE

## 2020-12-02 RX ORDER — FUROSEMIDE 10 MG/ML
40 INJECTION INTRAMUSCULAR; INTRAVENOUS 2 TIMES DAILY
Status: DISCONTINUED | OUTPATIENT
Start: 2020-12-02 | End: 2020-12-06

## 2020-12-02 RX ADMIN — INSULIN LISPRO 12 UNITS: 100 INJECTION, SOLUTION INTRAVENOUS; SUBCUTANEOUS at 17:24

## 2020-12-02 RX ADMIN — LEVOTHYROXINE SODIUM 100 MCG: 100 TABLET ORAL at 05:17

## 2020-12-02 RX ADMIN — LOSARTAN POTASSIUM 50 MG: 25 TABLET, FILM COATED ORAL at 08:24

## 2020-12-02 RX ADMIN — INSULIN GLARGINE 22 UNITS: 100 INJECTION, SOLUTION SUBCUTANEOUS at 19:54

## 2020-12-02 RX ADMIN — FUROSEMIDE 40 MG: 10 INJECTION, SOLUTION INTRAMUSCULAR; INTRAVENOUS at 08:24

## 2020-12-02 RX ADMIN — INSULIN LISPRO 6 UNITS: 100 INJECTION, SOLUTION INTRAVENOUS; SUBCUTANEOUS at 12:12

## 2020-12-02 RX ADMIN — FUROSEMIDE 40 MG: 10 INJECTION, SOLUTION INTRAMUSCULAR; INTRAVENOUS at 18:23

## 2020-12-02 RX ADMIN — INSULIN GLARGINE 22 UNITS: 100 INJECTION, SOLUTION SUBCUTANEOUS at 08:29

## 2020-12-02 RX ADMIN — DOCUSATE SODIUM 50MG AND SENNOSIDES 8.6MG 2 TABLET: 8.6; 5 TABLET, FILM COATED ORAL at 19:49

## 2020-12-02 RX ADMIN — ENOXAPARIN SODIUM 40 MG: 40 INJECTION SUBCUTANEOUS at 08:24

## 2020-12-02 RX ADMIN — ENOXAPARIN SODIUM 40 MG: 40 INJECTION SUBCUTANEOUS at 19:49

## 2020-12-02 RX ADMIN — Medication 5000 UNITS: at 08:24

## 2020-12-02 RX ADMIN — DEXAMETHASONE SODIUM PHOSPHATE 6 MG: 10 INJECTION, SOLUTION INTRAMUSCULAR; INTRAVENOUS at 08:23

## 2020-12-02 RX ADMIN — INSULIN LISPRO 6 UNITS: 100 INJECTION, SOLUTION INTRAVENOUS; SUBCUTANEOUS at 19:54

## 2020-12-02 RX ADMIN — Medication 10 ML: at 08:28

## 2020-12-02 RX ADMIN — Medication 10 ML: at 19:49

## 2020-12-02 ASSESSMENT — PAIN SCALES - GENERAL
PAINLEVEL_OUTOF10: 0

## 2020-12-02 NOTE — PROGRESS NOTES
Assessment complete. Blood pressure 128/60, pulse 83, temperature 98.1 °F (36.7 °C), temperature source Oral, resp. rate 24, height 5' 9\" (1.753 m), weight 242 lb 14.4 oz (110.2 kg), SpO2 93 %. Patient Alert and oriented, sitting up in his chair at this time with call light in reach. Patient continues on AirVo 50L and FiO2 45%. Respirations easy/even, and unlabored. Lung sounds diminished. No coughing noted. Bowel sounds positive x 4 quads. No edema noted. Will continue to monitor throughout shift.      Electronically signed by Sebas Solitario RN on 12/1/2020 at 9:15 PM

## 2020-12-02 NOTE — PROGRESS NOTES
131*   K 4.2 4.2 4.1   CL 99 96* 97*   CO2 25 23 26   PHOS 3.3  --   --    BUN 21* 19 19   CREATININE 0.7* 0.6* 0.7*     LIVER PROFILE:   Recent Labs     11/30/20  0501   AST 27   ALT 34   BILIDIR <0.2   BILITOT 0.6   ALKPHOS 62       Micro:  11/27 sars Cov2- detected on pcr         Chest imaging was reviewed by me and showed CXR 11/26  Patchy airspace opacities suspicious for pneumonia.  An atypical or viral    pneumonia is suspected             ASSESSMENT:  · COVID 19 pneumonia (tested in [place] and also had + rapid on admission)  · Acute hypoxic respiratory failure  · HTN  · DM2     PLAN:  · Patient is on Vapotherm for life-threatening respiratory failure to keep Sao2 >90%. Bipap while asleep. · Supplemental oxygen to maintain SaO2 >92%; wean as tolerated   · s/p 1 u CCP  · completed Remdesivir day #5; monitor LFT and renal  · Decadron day #7; monitor glucose  · Lasix 40mg IV bid  · SSI/lantus  · Lovenox BID  · Full code  · Senna - S      Patient is critically ill with acute respiratory failure. Critical care time spent reviewing labs/films, examining patient, collaborating with other physicians but excluding procedures for life threatening organ failure is 31 minutes.

## 2020-12-02 NOTE — PROGRESS NOTES
Care rounds completed with Dr. Delmon Lefort and multidisciplinary team. Reviewed labs, meds, VS, assessment, & plan of care for today. See dictated note and new orders for details. Reviewed POC to titrate off Airvo and continue IV Lasix. No further change in care.

## 2020-12-02 NOTE — PROGRESS NOTES
Vent settings :  A/C 16/450/5/60%     12/02/20 1712   Vent Information   Vent Mode AC/VC   Vt Ordered 450 mL   Rate Set 16 bmp   FiO2  60 %   SpO2 92 %   SpO2/FiO2 ratio 153.33   PEEP/CPAP 5   Spontaneous Breathing Trial (SBT) RT Doc   Pulse 72   Additional Respiratory  Assessments   Resp 24

## 2020-12-02 NOTE — PROGRESS NOTES
Reassessment completed. Pt is a/o x4. Following commands. Pt able to stand and use urinal. During ambulation patient SpO2 dropped to 79% on High Flow O2 of 8/L. Pt took 6 minutes to recover. SpO2 is now 90-92% on 8/L. Respirations are tachypenic with diminished lung sounds throughout lobes. Dry cough present. Non-pitting edema to BLE. Remains up to the chair. Denies need to go to bed. Bathed with wipes and hair washed. Call light within reach. Will monitor.       12/02/20 1600   Vitals   Pulse 98   Resp 22   /76   MAP (mmHg) 85   Oxygen Therapy   SpO2 (!) 79 %   O2 Device High flow nasal cannula   O2 Flow Rate (L/min) 8 L/min

## 2020-12-02 NOTE — PROGRESS NOTES
Reassessment completed. Pt is a/o x4. Following commands. Sitting up to chair. Respirations are unlabored, even, and lung sounds are diminished throughout lobes. High Flow O2 was at 12/L with SpO2 of 97%. Titrated down to 10/L. Non-pitting edema present to BLE. Elevated up in chair. PIV's x2 with no infusions. Pt voiding clear/yellow urine without difficulties. FSBS was 216, 6 units SSI given. Sitting up eating lunch. Call light within reach. Will monitor.       12/02/20 1200   Vitals   Pulse 72   Resp 18   /63   MAP (mmHg) 78   Oxygen Therapy   SpO2 97 %   O2 Device Heated high flow cannula   O2 Flow Rate (L/min) 12 L/min

## 2020-12-02 NOTE — PROGRESS NOTES
12/02/20 0233   NIV Type   Equipment Type V60   Mode Bilevel   Mask Type Full face mask   Mask Size Large   Settings/Measurements   IPAP 16 cmH20   CPAP/EPAP 8 cmH2O   Rate Ordered 12   Resp 18   FiO2  70 %   Vt Exhaled 482 mL   Mask Leak (lpm) 32 lpm   Comfort Level Good   Using Accessory Muscles No   SpO2 92   Patient Observation   Observations SPO2  92%  on 70% FIO2  BIPAP.     Alarm Settings   Alarms On Y   Oxygen Therapy/Pulse Ox   SpO2 92 %

## 2020-12-02 NOTE — PROGRESS NOTES
Shift assessment, completed, see flow sheet. Pt is alert and oriented x4. Following commands. Rhythm is SR with HR 90's, /84, SpO2 87-90%. Pt is on Airvo 40/L with FiO2 46%. Respirations are easy, even, and unlabored. Bilateral lung sounds diminished throughout lobes. PIV's x2 Line in [facility], WNL with no infusions. Non-pitting edema present to BLE. IV Lasix 40 mg given. Pt transferred from bed to chair. Desat to 80%. Pt took 5 minutes to recover. Denies SOB during transfer. Stated overall hes breathing has improved. Pt then transferred to [facility] to have BM. Now Sitting up eating breakfast in chair. Call light within reach.  Will continue to monitor     12/02/20 0900   Vitals   Temp 98.9 °F (37.2 °C)   Temp Source Axillary   Pulse 90   Heart Rate Source Monitor   Resp 18   /84   MAP (mmHg) 88   BP Location Left upper arm   BP Upper/Lower Upper   BP Method Automatic   Patient Position Up in chair   Level of Consciousness Alert (0)   MEWS Score 2   Patient Currently in Pain Denies   Cardiac Rhythm NSR   Oxygen Therapy   SpO2 90 %   O2 Device Heated high flow cannula  (airvo)   PEEP/CPAP (cm H2O) 46 cm H20   O2 Flow Rate (L/min) 40 L/min   Pain Assessment   Pain Assessment 0-10   RASS Score 0   Pain Level 0

## 2020-12-02 NOTE — PROGRESS NOTES
12/01/20 2223   NIV Type   $NIV $Daily Charge   Skin Assessment Clean, dry, & intact   Equipment Type V60   Mode Bilevel   Mask Type Full face mask   Mask Size Large   Settings/Measurements   IPAP 16 cmH20   CPAP/EPAP 8 cmH2O   Rate Ordered 12   Resp 15   FiO2  70 %   Vt Exhaled 641 mL   Mask Leak (lpm) 8 lpm   Comfort Level Good   Using Accessory Muscles No   SpO2 96   Patient Observation   Observations SPO2  96%  on 70%  FIO2  BIPAP.     Alarm Settings   Alarms On Y   Oxygen Therapy/Pulse Ox   SpO2 95 %

## 2020-12-02 NOTE — CARE COORDINATION
INTERDISCIPLINARY PLAN OF CARE CONFERENCE    Date/Time: 12/2/2020 12:54 PM  Completed by: Michelle Comer, Case Management      Patient Name:  Teri Blake  YOB: 1955  Admitting Diagnosis: Pneumonia due to COVID-19 virus [U07.1, J12.89]     Admit Date/Time:  11/26/2020  1:02 AM    Chart reviewed. Interdisciplinary team contacted or reviewed plan related to patient progress and discharge plans. Disciplines included Case Management, Nursing, and Dietitian. Current Status:ICU, O2, COVID-19 precautions  PT/OT recommendation for discharge plan of care: TBD    Expected D/C Disposition:  Home    Discharge Plan Comments: Plan cont for pt to return home at discharge if possible. PT eval when appropriate. Will follow also for poss home O2 needs.     Home O2 in place on admit: No  Pt informed of need to bring portable home O2 tank on day of discharge for nursing to connect prior to leaving:  Not Indicated  Verbalized agreement/Understanding:  Not Indicated

## 2020-12-02 NOTE — PROGRESS NOTES
Patient resting in bed. Patient compliant with wearing Bi-Pap. No c.o SOB. Oxygen level remains around 93-96%. No other changes at this time. Blood pressure 117/65, pulse 54, temperature 98 °F (36.7 °C), temperature source Oral, resp. rate 16, height 5' 9\" (1.753 m), weight 242 lb 14.4 oz (110.2 kg), SpO2 96 %.     Electronically signed by Milton Bingham RN on 12/2/2020 at 12:28 AM

## 2020-12-02 NOTE — PROGRESS NOTES
Hospitalist Progress Note      PCP: No primary care provider on file. Date of Admission: 11/26/2020    Chief Complaint: SOB. The patient is a 77 y.o. male w hx of DMII, HTN, Hypothyroidism, who presents with SOB.      Pt reports he first started feeling ill 2 weeks ago. Him and his daughter went to get tested albeit he is unable to tell me where he was tested and what the result of the test was.      He was reportedly tested positive for COVID in [place] - I do not see the results in our system. Confirmed here on 11/27     He presented to the hospital with worsening dyspnea and cough. He was found to be profoundly hypoxic - on AIRVO. 11/28  oxygenation worse. Vapotherm, to ICU   Subjective:       11/30  Remains on AirVO. Seen sitting up in chair, feels better today and used bipap last night  Watching ball game today     12/1  - pt not seen at bedside.  Up in room , sitting in chair again today   Denies any issues  Comfortable on AirVo vapotherm    12/2  Airvo 40L with FiO2 48%  BiPAP overnight   Afebrile   VSS      Medications:  Reviewed    Infusion Medications    dextrose       Scheduled Medications    furosemide  40 mg Intravenous BID    insulin glargine  22 Units Subcutaneous BID    sennosides-docusate sodium  2 tablet Oral BID    insulin lispro  0-18 Units Subcutaneous TID WC    insulin lispro  0-9 Units Subcutaneous Nightly    enoxaparin  40 mg Subcutaneous BID    vitamin D3  5,000 Units Oral Daily    levothyroxine  100 mcg Oral Daily    losartan  50 mg Oral Daily    sodium chloride flush  10 mL Intravenous 2 times per day    dexamethasone  6 mg Intravenous Daily     PRN Meds: sodium chloride flush, acetaminophen **OR** acetaminophen, polyethylene glycol, promethazine **OR** ondansetron, sodium chloride, glucose, dextrose, glucagon (rDNA), dextrose      Intake/Output Summary (Last 24 hours) at 12/2/2020 0725  Last data filed at 12/2/2020 5348  Gross per 24 hour   Intake --   Output 1650 ml   Net -1650 ml       Physical Exam Performed:    /75   Pulse 69   Temp 98 °F (36.7 °C) (Oral)   Resp 12   Ht 5' 9\" (1.753 m)   Wt 242 lb 14.4 oz (110.2 kg)   SpO2 94%   BMI 35.87 kg/m²     Pt not seen at bedside    General:  eldelry male up in chair  Awake, alert and oriented. Appears to be not in any distress  Neurological : grossly normal        Labs:   Recent Labs     11/30/20  0501 12/01/20  0509 12/02/20  0408   WBC 8.0 10.2 10.6   HGB 12.8* 13.5 13.1*   HCT 37.4* 40.1* 38.0*    258 280     Recent Labs     11/30/20  0501 12/01/20  0509 12/02/20  0408   * 129* 131*   K 4.2 4.2 4.1   CL 99 96* 97*   CO2 25 23 26   BUN 21* 19 19   CREATININE 0.7* 0.6* 0.7*   CALCIUM 8.8 8.9 8.8   PHOS 3.3  --   --      Recent Labs     11/30/20  0501   AST 27   ALT 34   BILIDIR <0.2   BILITOT 0.6   ALKPHOS 62     Recent Labs     11/30/20  0501   INR 1.46*     No results for input(s): CKTOTAL, TROPONINI in the last 72 hours. Urinalysis:    No results found for: Vikram Amanda, BACTERIA, RBCUA, BLOODU, [address], [address]    Radiology:  XR CHEST 1 VIEW   Final Result   Patchy airspace opacities suspicious for pneumonia. An atypical or viral   pneumonia is suspected. Assessment/Plan:    Active Hospital Problems    Diagnosis    Steroid-induced hyperglycemia [R73.9, T38.0X5A]    Acute respiratory failure with hypoxia (HCC) [J96.01]    Pneumonia due to COVID-19 virus [U07.1, J12.89]       Acute Hypox Resp Failure. Secondary to COVID PNA - positive 11/27. S/p convalescent plasma 11/26 1 unit  Lovenox 40BID. Remdesivir started completed x5 days  Dexamethasone started 11/27, D#7  LASIX 40 IV BID started per Pulmonary     remains on high flow oxygen in ICU, 40L and 48% FiO2   stable status today . Pt ok for intubation if needed           DMII with steroid hyperglycemia.    Increase lantus to 20 BID  ISS to high dose.          Hypothyroid - resumed synthroid.         HTN - remains on losartan. Hold HCTZ with hyponatremia - hyponatremia improving  .  .              DVT Prophylaxis: lovenox BID  Diet: DIET CARB CONTROL;  Code Status: Full Code    Ok for PCU         Andrey Malik MD 12/2/2020 4:13 PM

## 2020-12-03 LAB
BASOPHILS ABSOLUTE: 0 K/UL (ref 0–0.2)
BASOPHILS RELATIVE PERCENT: 0.1 %
EOSINOPHILS ABSOLUTE: 0.1 K/UL (ref 0–0.6)
EOSINOPHILS RELATIVE PERCENT: 0.6 %
GLUCOSE BLD-MCNC: 100 MG/DL (ref 70–99)
GLUCOSE BLD-MCNC: 270 MG/DL (ref 70–99)
GLUCOSE BLD-MCNC: 309 MG/DL (ref 70–99)
GLUCOSE BLD-MCNC: 375 MG/DL (ref 70–99)
HCT VFR BLD CALC: 38.2 % (ref 40.5–52.5)
HEMOGLOBIN: 13.2 G/DL (ref 13.5–17.5)
LYMPHOCYTES ABSOLUTE: 0.9 K/UL (ref 1–5.1)
LYMPHOCYTES RELATIVE PERCENT: 9.7 %
MCH RBC QN AUTO: 32.5 PG (ref 26–34)
MCHC RBC AUTO-ENTMCNC: 34.5 G/DL (ref 31–36)
MCV RBC AUTO: 94.3 FL (ref 80–100)
MONOCYTES ABSOLUTE: 0.9 K/UL (ref 0–1.3)
MONOCYTES RELATIVE PERCENT: 9.3 %
NEUTROPHILS ABSOLUTE: 7.5 K/UL (ref 1.7–7.7)
NEUTROPHILS RELATIVE PERCENT: 80.3 %
PDW BLD-RTO: 13 % (ref 12.4–15.4)
PERFORMED ON: ABNORMAL
PLATELET # BLD: 295 K/UL (ref 135–450)
PMV BLD AUTO: 6.1 FL (ref 5–10.5)
RBC # BLD: 4.05 M/UL (ref 4.2–5.9)
WBC # BLD: 9.4 K/UL (ref 4–11)

## 2020-12-03 PROCEDURE — 36415 COLL VENOUS BLD VENIPUNCTURE: CPT

## 2020-12-03 PROCEDURE — 2700000000 HC OXYGEN THERAPY PER DAY

## 2020-12-03 PROCEDURE — 6360000002 HC RX W HCPCS: Performed by: INTERNAL MEDICINE

## 2020-12-03 PROCEDURE — 94761 N-INVAS EAR/PLS OXIMETRY MLT: CPT

## 2020-12-03 PROCEDURE — 85025 COMPLETE CBC W/AUTO DIFF WBC: CPT

## 2020-12-03 PROCEDURE — 99232 SBSQ HOSP IP/OBS MODERATE 35: CPT | Performed by: INTERNAL MEDICINE

## 2020-12-03 PROCEDURE — 94660 CPAP INITIATION&MGMT: CPT

## 2020-12-03 PROCEDURE — 6370000000 HC RX 637 (ALT 250 FOR IP): Performed by: INTERNAL MEDICINE

## 2020-12-03 PROCEDURE — 2000000000 HC ICU R&B

## 2020-12-03 PROCEDURE — 99291 CRITICAL CARE FIRST HOUR: CPT | Performed by: INTERNAL MEDICINE

## 2020-12-03 PROCEDURE — 2580000003 HC RX 258: Performed by: INTERNAL MEDICINE

## 2020-12-03 RX ORDER — SENNA AND DOCUSATE SODIUM 50; 8.6 MG/1; MG/1
2 TABLET, FILM COATED ORAL 2 TIMES DAILY PRN
Status: DISCONTINUED | OUTPATIENT
Start: 2020-12-03 | End: 2020-12-08 | Stop reason: HOSPADM

## 2020-12-03 RX ADMIN — INSULIN LISPRO 12 UNITS: 100 INJECTION, SOLUTION INTRAVENOUS; SUBCUTANEOUS at 11:33

## 2020-12-03 RX ADMIN — ENOXAPARIN SODIUM 40 MG: 40 INJECTION SUBCUTANEOUS at 19:42

## 2020-12-03 RX ADMIN — INSULIN GLARGINE 22 UNITS: 100 INJECTION, SOLUTION SUBCUTANEOUS at 09:04

## 2020-12-03 RX ADMIN — LOSARTAN POTASSIUM 50 MG: 25 TABLET, FILM COATED ORAL at 09:03

## 2020-12-03 RX ADMIN — INSULIN GLARGINE 22 UNITS: 100 INJECTION, SOLUTION SUBCUTANEOUS at 19:45

## 2020-12-03 RX ADMIN — Medication 5000 UNITS: at 09:03

## 2020-12-03 RX ADMIN — Medication 10 ML: at 09:03

## 2020-12-03 RX ADMIN — Medication 10 ML: at 19:42

## 2020-12-03 RX ADMIN — FUROSEMIDE 40 MG: 10 INJECTION, SOLUTION INTRAMUSCULAR; INTRAVENOUS at 09:03

## 2020-12-03 RX ADMIN — FUROSEMIDE 40 MG: 10 INJECTION, SOLUTION INTRAMUSCULAR; INTRAVENOUS at 17:13

## 2020-12-03 RX ADMIN — ENOXAPARIN SODIUM 40 MG: 40 INJECTION SUBCUTANEOUS at 09:03

## 2020-12-03 RX ADMIN — INSULIN LISPRO 9 UNITS: 100 INJECTION, SOLUTION INTRAVENOUS; SUBCUTANEOUS at 17:13

## 2020-12-03 RX ADMIN — INSULIN LISPRO 7 UNITS: 100 INJECTION, SOLUTION INTRAVENOUS; SUBCUTANEOUS at 19:46

## 2020-12-03 RX ADMIN — DEXAMETHASONE SODIUM PHOSPHATE 6 MG: 10 INJECTION, SOLUTION INTRAMUSCULAR; INTRAVENOUS at 09:03

## 2020-12-03 RX ADMIN — LEVOTHYROXINE SODIUM 100 MCG: 100 TABLET ORAL at 05:50

## 2020-12-03 ASSESSMENT — PAIN SCALES - GENERAL
PAINLEVEL_OUTOF10: 0

## 2020-12-03 NOTE — PROGRESS NOTES
Hospitalist Progress Note      PCP: No primary care provider on file. Date of Admission: 11/26/2020    Chief Complaint: SOB. The patient is a 77 y.o. male w hx of DMII, HTN, Hypothyroidism, who presents with SOB.      Pt reports he first started feeling ill 2 weeks ago. Him and his daughter went to get tested albeit he is unable to tell me where he was tested and what the result of the test was.      He was reportedly tested positive for COVID in [place] - I do not see the results in our system. Confirmed here on 11/27     He presented to the hospital with worsening dyspnea and cough. He was found to be profoundly hypoxic - on AIRVO. 11/28  oxygenation worse. Vapotherm, to ICU   Subjective:       11/30  Remains on AirVO. Seen sitting up in chair, feels better today and used bipap last night  Watching ball game today     12/1  - pt not seen at bedside.  Up in room , sitting in chair again today   Denies any issues  Comfortable on AirVo vapotherm    12/2  Airvo 40L with FiO2 48%  BiPAP overnight   Afebrile   VSS    12/3- pt not seen at bedside  Remains on 10 L oxygen  Remains active in room   Denies any issues        Medications:  Reviewed    Infusion Medications    dextrose       Scheduled Medications    furosemide  40 mg Intravenous BID    insulin glargine  22 Units Subcutaneous BID    insulin lispro  0-18 Units Subcutaneous TID WC    insulin lispro  0-9 Units Subcutaneous Nightly    enoxaparin  40 mg Subcutaneous BID    vitamin D3  5,000 Units Oral Daily    levothyroxine  100 mcg Oral Daily    losartan  50 mg Oral Daily    sodium chloride flush  10 mL Intravenous 2 times per day    dexamethasone  6 mg Intravenous Daily     PRN Meds: sennosides-docusate sodium, sodium chloride flush, acetaminophen **OR** acetaminophen, polyethylene glycol, promethazine **OR** ondansetron, sodium chloride, glucose, dextrose, glucagon (rDNA), dextrose      Intake/Output Summary (Last 24 hours) at 12/3/2020 [address] filed at 12/3/2020 1300  Gross per 24 hour   Intake 970 ml   Output 2775 ml   Net -1805 ml       Physical Exam Performed:    BP (!) 107/57   Pulse 76   Temp 98 °F (36.7 °C) (Oral)   Resp 21   Ht 5' 9\" (1.753 m)   Wt 242 lb 14.4 oz (110.2 kg)   SpO2 93%   BMI 35.87 kg/m²     Pt not seen at bedside    General:  eldelry male up in chair  Awake, alert and oriented. Appears to be not in any distress  Neurological : grossly normal        Labs:   Recent Labs     12/01/20  0509 12/02/20  0408 12/03/20  0443   WBC 10.2 10.6 9.4   HGB 13.5 13.1* 13.2*   HCT 40.1* 38.0* 38.2*    280 295     Recent Labs     12/01/20  0509 12/02/20  0408   * 131*   K 4.2 4.1   CL 96* 97*   CO2 23 26   BUN 19 19   CREATININE 0.6* 0.7*   CALCIUM 8.9 8.8     No results for input(s): AST, ALT, BILIDIR, BILITOT, ALKPHOS in the last 72 hours. No results for input(s): INR in the last 72 hours. No results for input(s): Towana Ball in the last 72 hours. Urinalysis:    No results found for: Campoverde Goldmann, BACTERIA, RBCUA, BLOODU, [address], [address]    Radiology:  XR CHEST 1 VIEW   Final Result   Patchy airspace opacities suspicious for pneumonia. An atypical or viral   pneumonia is suspected. Assessment/Plan:    Active Hospital Problems    Diagnosis    Steroid-induced hyperglycemia [R73.9, T38.0X5A]    Acute respiratory failure with hypoxia (HCC) [J96.01]    Pneumonia due to COVID-19 virus [U07.1, J12.89]             Assessment/Plan:         Active Hospital Problems     Diagnosis    Steroid-induced hyperglycemia [R73.9, T38.0X5A]    Acute respiratory failure with hypoxia (HCC) [J96.01]    Pneumonia due to COVID-19 virus [U07.1, J12.89]         Acute Hypox Resp Failure   2/2 COVID PNA - positive 11/27  - S/p CCP 11/26 1 unit  - Lovenox 40BID. - Remdesivir started completed x5 days  - Dexamethasone started 11/27, D#8  - LASIX 40 IV BID started per Pulmonary   stable status today .  Pt ok for intubation if needed   - off vapotherm on 12L > 10 L NC HFNC     DMII with steroid hyperglycemia  - Increase lantus to 20 BID  - ISS to high dose  - BG in 300s     Hypothyroid   - resumed synthroid.      HTN   - well controlled  - remains on losartan.    - Hold HCTZ with hyponatremia - hyponatremia improving (131 yesterday)     DVT Prophylaxis: lovenox BID  Diet: DIET CARB CONTROL;  Code Status: Full Code     Dispo: cont care  Ok to transfer      Eileen Correa MD 12/3/2020 2:24 PM

## 2020-12-03 NOTE — PROGRESS NOTES
Pulmonary & Critical Care Medicine ICU Progress Note    CC: COVID 19 pneumonia    Events of Last 24 hours:   Patient weaned off of vapotherm to HFNC. He is on 12L NC     Invasive Lines: IV:     MV:   Vent Mode: AC/VC Rate Set: 16 bmp/Vt Ordered: 450 mL/ /FiO2 : 70 %  No results for input(s): PHART, NHJ1ERT, PO2ART in the last 72 hours. IV:   dextrose       Vitals:  /83   Pulse 60   Temp 97.6 °F (36.4 °C) (Oral)   Resp 17   Ht 5' 9\" (1.753 m)   Wt 242 lb 14.4 oz (110.2 kg)   SpO2 92%   BMI 35.87 kg/m²   on 10l NC    Intake/Output Summary (Last 24 hours) at 12/3/2020 0543  Last data filed at 12/2/2020 2200  Gross per 24 hour   Intake 1330 ml   Output 2975 ml   Net -1645 ml     EXAM:  Constitutional: chronically ill appearing. NCAT  Eyes: PERRL. No sclera icterus. ENT: Normal Nose. Normal Tongue. Neck:  Trachea is midline. No thyroid tenderness. Respiratory: No accessory muscle usage. No increase in resp distress  Cardiovascular: No JVD, No LE edema. GI: Non-distended no hernia  Neuro: Alert. Follows commands. moves all extremities. Sitting up in bed.     Scheduled Meds:   furosemide  40 mg Intravenous BID    insulin glargine  22 Units Subcutaneous BID    sennosides-docusate sodium  2 tablet Oral BID    insulin lispro  0-18 Units Subcutaneous TID WC    insulin lispro  0-9 Units Subcutaneous Nightly    enoxaparin  40 mg Subcutaneous BID    vitamin D3  5,000 Units Oral Daily    levothyroxine  100 mcg Oral Daily    losartan  50 mg Oral Daily    sodium chloride flush  10 mL Intravenous 2 times per day    dexamethasone  6 mg Intravenous Daily     PRN Meds:  sodium chloride flush, acetaminophen **OR** acetaminophen, polyethylene glycol, promethazine **OR** ondansetron, sodium chloride, glucose, dextrose, glucagon (rDNA), dextrose    Results:  CBC:   Recent Labs     12/01/20  0509 12/02/20  0408 12/03/20  0443   WBC 10.2 10.6 9.4   HGB 13.5 13.1* 13.2*   HCT 40.1* 38.0* 38.2*   MCV 96.7 94.9 94.3    280 295     BMP:   Recent Labs     12/01/20  0509 12/02/20  0408   * 131*   K 4.2 4.1   CL 96* 97*   CO2 23 26   BUN 19 19   CREATININE 0.6* 0.7*     LIVER PROFILE:   No results for input(s): AST, ALT, LIPASE, BILIDIR, BILITOT, ALKPHOS in the last 72 hours. Invalid input(s): AMYLASE,  ALB    Micro:  11/27 sars Cov2- detected on pcr         Chest imaging was reviewed by me and showed CXR 11/26  Patchy airspace opacities suspicious for pneumonia.  An atypical or viral    pneumonia is suspected             ASSESSMENT:  · COVID 19 pneumonia (tested [place] [address] and also had + rapid on admission)  · Acute hypoxic respiratory failure  · HTN  · DM2     PLAN:  · Patient is on Vapotherm for life-threatening respiratory failure to keep Sao2 >90%. Bipap while asleep. · Supplemental oxygen to maintain SaO2 >92%; wean as tolerated   · s/p 1 u CCP  · completed Remdesivir day #5; monitor LFT and renal  · Decadron day #8; monitor glucose  · Lasix 40mg IV bid  · SSI/lantus  · Lovenox BID  · Full code  · Full diet  · Senna - S      Patient is critically ill with acute respiratory failure. Critical care time spent reviewing labs/films, examining patient, collaborating with other physicians but excluding procedures for life threatening organ failure is 32 minutes.

## 2020-12-03 NOTE — PROGRESS NOTES
Care rounds completed with Dr. Tushar Sampson and multidisciplinary team. Reviewed labs, meds, VS, assessment, & plan of care for today. See dictated note and new orders for details. Reviewed POC for continue current treatments. Pt switched from BiPAP to 12/L high flow O2. Pt took 5 minutes to adjust with SpO2 ranging from 86-90%. Pt now sitting on side of bed to eat breakfast and SpO2 is 92-93% on 12/L.

## 2020-12-03 NOTE — PLAN OF CARE
Problem: Falls - Risk of:  Goal: Will remain free from falls  Description: Will remain free from falls  Outcome: Ongoing     Problem: Airway Clearance - Ineffective  Goal: Achieve or maintain patent airway  Outcome: Ongoing     Problem: Breathing Pattern - Ineffective  Goal: Ability to achieve and maintain a regular respiratory rate  Outcome: Ongoing     Problem:  Body Temperature -  Risk of, Imbalanced  Goal: Ability to maintain a body temperature within defined limits  Outcome: Ongoing

## 2020-12-03 NOTE — PROGRESS NOTES
Shift assessment, completed, see flow sheet. Pt is alert and oriented x 4. Following commands. Rhythm is SR with HR 80-90's. SpO2 91% on 10/L High Flow O2. Respirations are easy, even, and unlabored. Bilateral lung sounds diminished throughout lobes. Lasix 40 mg IV given. Pt transferred from bed to chair with desat from 91% to 83%. Recovered in 2-3 minutes. PIV's x2 in [address] with no infusions. Pt urinating without any difficulties. Urine is emmanuel/clear. Call light within reach. Refuses chair/bed alarms. Pt will call out for assistance.  Will continue to monitor     12/03/20 0900   Vitals   Temp 97.8 °F (36.6 °C)   Temp Source Axillary   Pulse 88   Heart Rate Source Monitor   Resp 14   /80   MAP (mmHg) 95   BP Location Right upper arm   BP Upper/Lower Upper   BP Method Automatic   Patient Position Sitting   Level of Consciousness Alert (0)   MEWS Score 0   Patient Currently in Pain Denies   Cardiac Rhythm NSR   Oxygen Therapy   SpO2 91 %   O2 Device High flow nasal cannula   O2 Flow Rate (L/min) 10 L/min   Pain Assessment   Pain Assessment 0-10   RASS Score 0   Pain Level 0

## 2020-12-03 NOTE — PROGRESS NOTES
Spoke to Dr. Freda Jimenez regarding patients oxygen improving throughout the day. Pt was titrated from 8/L to 6/L of high flow O2. Pt denies any SOB at rest. Sitting up to the chair.  Per Dr. Freda Jimenez patient can be transferred to telemetry unit if bed is needed in ICU.      12/03/20 1600   Vitals   Temp 97.7 °F (36.5 °C)   Temp Source Axillary   Pulse 79   Heart Rate Source Monitor   Resp 17   /67   MAP (mmHg) 81   BP Location Right upper arm   BP Upper/Lower Upper   BP Method Automatic   Patient Position Up in chair   Level of Consciousness Alert (0)   MEWS Score 1   Patient Currently in Pain Denies   Cardiac Rhythm NSR   Oxygen Therapy   SpO2 90 %   O2 Device High flow nasal cannula   O2 Flow Rate (L/min) 6 L/min   Pain Assessment   Pain Assessment 0-10   RASS Score 0   Pain Level 0

## 2020-12-03 NOTE — PROGRESS NOTES
Reassessment completed. Pt a/o x4. Sitting up to chair. FSBS was 309, 12 units SSI given at lunch, ate 100% without difficulties. Respirations are easy, unlabored, and sounds are diminished to lower lobes. SpO2 95% on 10/L high flow O2. Pt ambulated up to [facility], large BM, desat during transfer to 84%. Pt recovered over 3 minutes. Non-pitting edema to BLE. Call light within reach. Will monitor.       12/03/20 1300   Vitals   Pulse 88   Resp 16   /63   MAP (mmHg) 74   Oxygen Therapy   SpO2 95 %   O2 Device High flow nasal cannula   O2 Flow Rate (L/min) 10 L/min

## 2020-12-03 NOTE — PROGRESS NOTES
Reassessment complete. Patient continues on Bi-Pap at this time per order. Oxygen saturations remain 93% throughout the night. Respirations easy/even, unlabored. No complaints of SOB voiced. No changes made this shift. Blood pressure 121/80, pulse 60, temperature 97.6 °F (36.4 °C), temperature source Oral, resp. rate 17, height 5' 9\" (1.753 m), weight 242 lb 14.4 oz (110.2 kg), SpO2 94 %.       Electronically signed by Madelyn Lozano RN on 12/3/2020 at 5:02 AM

## 2020-12-03 NOTE — PROGRESS NOTES
Assessment complete. Blood pressure 123/68, pulse 67, temperature 97.6 °F (36.4 °C), temperature source Oral, resp. rate 16, height 5' 9\" (1.753 m), weight 242 lb 14.4 oz (110.2 kg), SpO2 93 %. Patient continues on 10L high flow NC.oxygen saturations remain 93% and better. Lung sounds clear. Bowel sounds positive x 4 quads. No edema noted. Patient continues on Carb control diet with no problems noted. Blood sugar was high this evening. Sliding scale coverage given per order along with lantus. No complaints at this time. Will continue to monitor throughout shift.    Electronically signed by Yeimy Mandel RN on 12/2/2020 at 9:39 PM

## 2020-12-03 NOTE — PROGRESS NOTES
This note also relates to the following rows which could not be included:  SpO2 - Cannot attach notes to unvalidated device data       12/03/20 0251   NIV Type   Equipment Type V60   Mode Bilevel   Mask Type Full face mask   Mask Size Large   Settings/Measurements   IPAP 16 cmH20   CPAP/EPAP 8 cmH2O   Rate Ordered 12   Resp 17   FiO2  70 %   Vt Exhaled 785 mL   Mask Leak (lpm) 21 lpm   Comfort Level Good   Using Accessory Muscles No   SpO2 95   Alarm Settings   Alarms On Y        12/03/20 0251   NIV Type   Equipment Type V60   Mode Bilevel   Mask Type Full face mask   Mask Size Large   Settings/Measurements   IPAP 16 cmH20   CPAP/EPAP 8 cmH2O   Rate Ordered 12   Resp 17   FiO2  70 %   Vt Exhaled 785 mL   Mask Leak (lpm) 21 lpm   Comfort Level Good   Using Accessory Muscles No   SpO2 95   Alarm Settings   Alarms On Y        12/03/20 0251   NIV Type   Equipment Type V60   Mode Bilevel   Mask Type Full face mask   Mask Size Large   Settings/Measurements   IPAP 16 cmH20   CPAP/EPAP 8 cmH2O   Rate Ordered 12   Resp 17   FiO2  70 %   Vt Exhaled 785 mL   Mask Leak (lpm) 21 lpm   Comfort Level Good   Using Accessory Muscles No   SpO2 95   Alarm Settings   Alarms On Y        12/03/20 0251   NIV Type   Equipment Type V60   Mode Bilevel   Mask Type Full face mask   Mask Size Large   Settings/Measurements   IPAP 16 cmH20   CPAP/EPAP 8 cmH2O   Rate Ordered 12   Resp 17   FiO2  70 %   Vt Exhaled 785 mL   Mask Leak (lpm) 21 lpm   Comfort Level Good   Using Accessory Muscles No   SpO2 95   Alarm Settings   Alarms On Y        12/03/20 0251   NIV Type   Equipment Type V60   Mode Bilevel   Mask Type Full face mask   Mask Size Large   Settings/Measurements   IPAP 16 cmH20   CPAP/EPAP 8 cmH2O   Rate Ordered 12   Resp 17   FiO2  70 %   Vt Exhaled 785 mL   Mask Leak (lpm) 21 lpm   Comfort Level Good   Using Accessory Muscles No   SpO2 95   Alarm Settings   Alarms On Y

## 2020-12-03 NOTE — PROGRESS NOTES
This note also relates to the following rows which could not be included:  SpO2 - Cannot attach notes to unvalidated device data       12/02/20 2244   NIV Type   Skin Assessment Clean, dry, & intact   Skin Protection for O2 Device Yes   Equipment Type V60   Mode Bilevel   Mask Type Full face mask   Mask Size Large   Settings/Measurements   IPAP 16 cmH20   CPAP/EPAP 8 cmH2O   Rate Ordered 12   Resp 19   FiO2  70 %   Vt Exhaled 789 mL   Mask Leak (lpm) 5 lpm   Comfort Level Good   Using Accessory Muscles No   SpO2 93   Patient Observation   Observations SPO2  93%  on 70%  FIO2  BIPAP. Alarm Settings   Alarms On Y        12/02/20 2244   NIV Type   Skin Assessment Clean, dry, & intact   Skin Protection for O2 Device Yes   Equipment Type V60   Mode Bilevel   Mask Type Full face mask   Mask Size Large   Settings/Measurements   IPAP 16 cmH20   CPAP/EPAP 8 cmH2O   Rate Ordered 12   Resp 19   FiO2  70 %   Vt Exhaled 789 mL   Mask Leak (lpm) 5 lpm   Comfort Level Good   Using Accessory Muscles No   SpO2 93   Patient Observation   Observations SPO2  93%  on 70%  FIO2  BIPAP. Alarm Settings   Alarms On Y        12/02/20 2244   NIV Type   Skin Assessment Clean, dry, & intact   Skin Protection for O2 Device Yes   Equipment Type V60   Mode Bilevel   Mask Type Full face mask   Mask Size Large   Settings/Measurements   IPAP 16 cmH20   CPAP/EPAP 8 cmH2O   Rate Ordered 12   Resp 19   FiO2  70 %   Vt Exhaled 789 mL   Mask Leak (lpm) 5 lpm   Comfort Level Good   Using Accessory Muscles No   SpO2 93   Patient Observation   Observations SPO2  93%  on 70%  FIO2  BIPAP.     Alarm Settings   Alarms On Y        12/02/20 2244   NIV Type   Skin Assessment Clean, dry, & intact   Skin Protection for O2 Device Yes   Equipment Type V60   Mode Bilevel   Mask Type Full face mask   Mask Size Large   Settings/Measurements   IPAP 16 cmH20   CPAP/EPAP 8 cmH2O   Rate Ordered 12   Resp 19   FiO2  70 %   Vt Exhaled 789 mL   Mask Leak (lpm) 5 lpm

## 2020-12-04 LAB
A/G RATIO: 0.7 (ref 1.1–2.2)
ALBUMIN SERPL-MCNC: 3 G/DL (ref 3.4–5)
ALP BLD-CCNC: 67 U/L (ref 40–129)
ALT SERPL-CCNC: 29 U/L (ref 10–40)
ANION GAP SERPL CALCULATED.3IONS-SCNC: 12 MMOL/L (ref 3–16)
AST SERPL-CCNC: 20 U/L (ref 15–37)
BASOPHILS ABSOLUTE: 0 K/UL (ref 0–0.2)
BASOPHILS RELATIVE PERCENT: 0.2 %
BILIRUB SERPL-MCNC: 0.6 MG/DL (ref 0–1)
BUN BLDV-MCNC: 23 MG/DL (ref 7–20)
CALCIUM SERPL-MCNC: 9.1 MG/DL (ref 8.3–10.6)
CHLORIDE BLD-SCNC: 93 MMOL/L (ref 99–110)
CO2: 26 MMOL/L (ref 21–32)
CREAT SERPL-MCNC: 0.8 MG/DL (ref 0.8–1.3)
EOSINOPHILS ABSOLUTE: 0.1 K/UL (ref 0–0.6)
EOSINOPHILS RELATIVE PERCENT: 0.7 %
GFR AFRICAN AMERICAN: >60
GFR NON-AFRICAN AMERICAN: >60
GLOBULIN: 4.3 G/DL
GLUCOSE BLD-MCNC: 117 MG/DL (ref 70–99)
GLUCOSE BLD-MCNC: 166 MG/DL (ref 70–99)
GLUCOSE BLD-MCNC: 182 MG/DL (ref 70–99)
GLUCOSE BLD-MCNC: 258 MG/DL (ref 70–99)
GLUCOSE BLD-MCNC: 309 MG/DL (ref 70–99)
HCT VFR BLD CALC: 38.5 % (ref 40.5–52.5)
HEMOGLOBIN: 13.4 G/DL (ref 13.5–17.5)
LYMPHOCYTES ABSOLUTE: 0.9 K/UL (ref 1–5.1)
LYMPHOCYTES RELATIVE PERCENT: 8.6 %
MCH RBC QN AUTO: 32.7 PG (ref 26–34)
MCHC RBC AUTO-ENTMCNC: 34.8 G/DL (ref 31–36)
MCV RBC AUTO: 94 FL (ref 80–100)
MONOCYTES ABSOLUTE: 0.9 K/UL (ref 0–1.3)
MONOCYTES RELATIVE PERCENT: 8.1 %
NEUTROPHILS ABSOLUTE: 8.9 K/UL (ref 1.7–7.7)
NEUTROPHILS RELATIVE PERCENT: 82.4 %
PDW BLD-RTO: 13.5 % (ref 12.4–15.4)
PERFORMED ON: ABNORMAL
PLATELET # BLD: 310 K/UL (ref 135–450)
PMV BLD AUTO: 6.9 FL (ref 5–10.5)
POTASSIUM SERPL-SCNC: 3.9 MMOL/L (ref 3.5–5.1)
RBC # BLD: 4.1 M/UL (ref 4.2–5.9)
SODIUM BLD-SCNC: 131 MMOL/L (ref 136–145)
TOTAL PROTEIN: 7.3 G/DL (ref 6.4–8.2)
WBC # BLD: 10.8 K/UL (ref 4–11)

## 2020-12-04 PROCEDURE — 97163 PT EVAL HIGH COMPLEX 45 MIN: CPT

## 2020-12-04 PROCEDURE — 97530 THERAPEUTIC ACTIVITIES: CPT

## 2020-12-04 PROCEDURE — 6360000002 HC RX W HCPCS: Performed by: INTERNAL MEDICINE

## 2020-12-04 PROCEDURE — 97165 OT EVAL LOW COMPLEX 30 MIN: CPT

## 2020-12-04 PROCEDURE — 2060000000 HC ICU INTERMEDIATE R&B

## 2020-12-04 PROCEDURE — 2580000003 HC RX 258: Performed by: INTERNAL MEDICINE

## 2020-12-04 PROCEDURE — 85025 COMPLETE CBC W/AUTO DIFF WBC: CPT

## 2020-12-04 PROCEDURE — 6370000000 HC RX 637 (ALT 250 FOR IP): Performed by: INTERNAL MEDICINE

## 2020-12-04 PROCEDURE — 80053 COMPREHEN METABOLIC PANEL: CPT

## 2020-12-04 PROCEDURE — 2700000000 HC OXYGEN THERAPY PER DAY

## 2020-12-04 PROCEDURE — 94761 N-INVAS EAR/PLS OXIMETRY MLT: CPT

## 2020-12-04 PROCEDURE — 99291 CRITICAL CARE FIRST HOUR: CPT | Performed by: INTERNAL MEDICINE

## 2020-12-04 PROCEDURE — 36415 COLL VENOUS BLD VENIPUNCTURE: CPT

## 2020-12-04 PROCEDURE — 97116 GAIT TRAINING THERAPY: CPT

## 2020-12-04 PROCEDURE — 99232 SBSQ HOSP IP/OBS MODERATE 35: CPT | Performed by: INTERNAL MEDICINE

## 2020-12-04 PROCEDURE — 94660 CPAP INITIATION&MGMT: CPT

## 2020-12-04 RX ADMIN — INSULIN LISPRO 6 UNITS: 100 INJECTION, SOLUTION INTRAVENOUS; SUBCUTANEOUS at 21:15

## 2020-12-04 RX ADMIN — Medication 5000 UNITS: at 10:42

## 2020-12-04 RX ADMIN — FUROSEMIDE 40 MG: 10 INJECTION, SOLUTION INTRAMUSCULAR; INTRAVENOUS at 09:37

## 2020-12-04 RX ADMIN — FUROSEMIDE 40 MG: 10 INJECTION, SOLUTION INTRAMUSCULAR; INTRAVENOUS at 17:43

## 2020-12-04 RX ADMIN — Medication 10 ML: at 09:37

## 2020-12-04 RX ADMIN — ENOXAPARIN SODIUM 40 MG: 40 INJECTION SUBCUTANEOUS at 09:36

## 2020-12-04 RX ADMIN — LOSARTAN POTASSIUM 50 MG: 25 TABLET, FILM COATED ORAL at 09:36

## 2020-12-04 RX ADMIN — INSULIN GLARGINE 22 UNITS: 100 INJECTION, SOLUTION SUBCUTANEOUS at 21:16

## 2020-12-04 RX ADMIN — INSULIN GLARGINE 22 UNITS: 100 INJECTION, SOLUTION SUBCUTANEOUS at 09:37

## 2020-12-04 RX ADMIN — ENOXAPARIN SODIUM 40 MG: 40 INJECTION SUBCUTANEOUS at 21:14

## 2020-12-04 RX ADMIN — INSULIN LISPRO 9 UNITS: 100 INJECTION, SOLUTION INTRAVENOUS; SUBCUTANEOUS at 17:28

## 2020-12-04 RX ADMIN — Medication 10 ML: at 21:14

## 2020-12-04 RX ADMIN — DEXAMETHASONE SODIUM PHOSPHATE 6 MG: 10 INJECTION, SOLUTION INTRAMUSCULAR; INTRAVENOUS at 09:36

## 2020-12-04 RX ADMIN — LEVOTHYROXINE SODIUM 100 MCG: 100 TABLET ORAL at 05:00

## 2020-12-04 RX ADMIN — INSULIN LISPRO 3 UNITS: 100 INJECTION, SOLUTION INTRAVENOUS; SUBCUTANEOUS at 12:40

## 2020-12-04 ASSESSMENT — PAIN SCALES - GENERAL: PAINLEVEL_OUTOF10: 0

## 2020-12-04 NOTE — PROGRESS NOTES
Care rounds complete with multidisciplinary team.  POC discussed, new orders for PT/OT. Pt resting comfortably at this time, will continue to monitor.   She Kimball RN

## 2020-12-04 NOTE — PROGRESS NOTES
ondansetron, sodium chloride, glucose, dextrose, glucagon (rDNA), dextrose      Intake/Output Summary (Last 24 hours) at 12/4/2020 1527  Last data filed at 12/4/2020 1300  Gross per 24 hour   Intake 960 ml   Output 1525 ml   Net -565 ml       Physical Exam Performed:    BP (!) 97/59   Pulse 96   Temp 97.9 °F (36.6 °C) (Oral)   Resp 8   Ht 5' 9\" (1.753 m)   Wt 242 lb 14.4 oz (110.2 kg)   SpO2 92%   BMI 35.87 kg/m²     Pt not seen at bedside    General:  eldelry male up in chair  Awake, alert and oriented. Appears to be not in any distress  Neurological : grossly normal        Labs:   Recent Labs     12/02/20  0408 12/03/20  0443 12/04/20  0423   WBC 10.6 9.4 10.8   HGB 13.1* 13.2* 13.4*   HCT 38.0* 38.2* 38.5*    295 310     Recent Labs     12/02/20  0408 12/04/20  0422   * 131*   K 4.1 3.9   CL 97* 93*   CO2 26 26   BUN 19 23*   CREATININE 0.7* 0.8   CALCIUM 8.8 9.1     Recent Labs     12/04/20  0422   AST 20   ALT 29   BILITOT 0.6   ALKPHOS 67     No results for input(s): INR in the last 72 hours. No results for input(s): Earlis Anadarko in the last 72 hours. Urinalysis:    No results found for: Delorse Lemming, BACTERIA, RBCUA, BLOODU, [address], [address]    Radiology:  XR CHEST 1 VIEW   Final Result   Patchy airspace opacities suspicious for pneumonia. An atypical or viral   pneumonia is suspected. Assessment/Plan:    Active Hospital Problems    Diagnosis    Steroid-induced hyperglycemia [R73.9, T38.0X5A]    Acute respiratory failure with hypoxia (HCC) [J96.01]    Pneumonia due to COVID-19 virus [U07.1, J12.89]             Assessment/Plan:         Active Hospital Problems     Diagnosis    Steroid-induced hyperglycemia [R73.9, T38.0X5A]    Acute respiratory failure with hypoxia (HCC) [J96.01]    Pneumonia due to COVID-19 virus [U07.1, J12.89]         Acute Hypox Resp Failure   2/2 COVID PNA - positive 11/27    - S/p CCP 11/26 1 unit  - Lovenox 40BID.    - Remdesivir started completed x5 days  - Dexamethasone started 11/27, D#8  - LASIX 40 IV BID started per Pulmonary   stable status today . Pt ok for intubation if needed   - off vapotherm on 12L > 9 L NC HFNC     DMII with steroid hyperglycemia  - Increase lantus to 20 BID  - ISS to high dose  - BG in 300s, improved now      Hypothyroid   - resumed synthroid.      HTN   - well controlled  - remains on losartan.      - Hold HCTZ with hyponatremia - hyponatremia improving (131)     DVT Prophylaxis: lovenox BID  Diet: DIET CARB CONTROL;  Code Status: Full Code     Dispo: cont care  Ok to transfer      Homer Paulino MD 12/4/2020 3:27 PM

## 2020-12-04 NOTE — PROGRESS NOTES
12/04/20 0328   NIV Type   Equipment Type V60   Mode Bilevel   Mask Type Full face mask   Mask Size Large   Settings/Measurements   IPAP 16 cmH20   CPAP/EPAP 8 cmH2O   Rate Ordered 12   Resp 19   FiO2  70 %   Vt Exhaled 547 mL   Mask Leak (lpm) 21 lpm   Comfort Level Good   Using Accessory Muscles No   SpO2 96   Alarm Settings   Alarms On Y   Oxygen Therapy/Pulse Ox   SpO2 96 %

## 2020-12-04 NOTE — PROGRESS NOTES
Pulmonary & Critical Care Medicine ICU Progress Note    CC: COVID 19 pneumonia    Events of Last 24 hours:   Patient with persistent hypoxemia. Patient having bowel movements. Invasive Lines: IV:     MV:   Vent Mode: AC/VC Rate Set: 16 bmp/Vt Ordered: 450 mL/ /FiO2 : 70 %  No results for input(s): PHART, DEE1SCJ, PO2ART in the last 72 hours. IV:   dextrose       Vitals:  BP (!) 115/58   Pulse 98   Temp 97.9 °F (36.6 °C) (Oral)   Resp 17   Ht 5' 9\" (1.753 m)   Wt 242 lb 14.4 oz (110.2 kg)   SpO2 93%   BMI 35.87 kg/m²   on 9L     Intake/Output Summary (Last 24 hours) at 12/4/2020 1032  Last data filed at 12/4/2020 0950  Gross per 24 hour   Intake 840 ml   Output 1150 ml   Net -310 ml     EXAM:  Constitutional: chronically ill appearing. NCAT  Eyes: PERRL. No sclera icterus. ENT: Normal Nose. Normal Tongue. Neck:  Trachea is midline. No thyroid tenderness. Respiratory: No accessory muscle usage. No increase in resp distress  Cardiovascular: No JVD, No LE edema. GI: Non-distended no hernia  Neuro: Alert. Follows commands. moves all extremities. Sitting up in bed.     Scheduled Meds:   furosemide  40 mg Intravenous BID    insulin glargine  22 Units Subcutaneous BID    insulin lispro  0-18 Units Subcutaneous TID WC    insulin lispro  0-9 Units Subcutaneous Nightly    enoxaparin  40 mg Subcutaneous BID    vitamin D3  5,000 Units Oral Daily    levothyroxine  100 mcg Oral Daily    losartan  50 mg Oral Daily    sodium chloride flush  10 mL Intravenous 2 times per day    dexamethasone  6 mg Intravenous Daily     PRN Meds:  sennosides-docusate sodium, sodium chloride flush, acetaminophen **OR** acetaminophen, polyethylene glycol, promethazine **OR** ondansetron, sodium chloride, glucose, dextrose, glucagon (rDNA), dextrose    Results:  CBC:   Recent Labs     12/02/20  0408 12/03/20  0443 12/04/20  0423   WBC 10.6 9.4 10.8   HGB 13.1* 13.2* 13.4*   HCT 38.0* 38.2* 38.5*   MCV 94.9 94.3 94.0  295 310     BMP:   Recent Labs     12/02/20  0408 12/04/20  0422   * 131*   K 4.1 3.9   CL 97* 93*   CO2 26 26   BUN 19 23*   CREATININE 0.7* 0.8     LIVER PROFILE:   Recent Labs     12/04/20  0422   AST 20   ALT 29   BILITOT 0.6   ALKPHOS 79       Micro:  11/27 sars Cov2- detected on pcr         Chest imaging was reviewed by me and showed CXR 11/26  Patchy airspace opacities suspicious for pneumonia.  An atypical or viral    pneumonia is suspected             ASSESSMENT:  · COVID 19 pneumonia (tested in [facility] [address] and also had + rapid on admission)  · Acute hypoxic respiratory failure  · HTN  · DM2     PLAN:  · Patient is on Vapotherm for life-threatening respiratory failure to keep Sao2 >90%. Bipap while asleep. · Supplemental oxygen to maintain SaO2 >92%; wean as tolerated   · s/p 1 u CCP  · completed Remdesivir day #5; monitor LFT and renal  · Decadron day #9; monitor glucose  · Lasix 40mg IV bid  · SSI/lantus 22 U   · Follow CMP  · Lovenox BID  · Full code  · Full diet  · Senna - S  · PT/OT      Patient is critically ill with acute respiratory failure. Critical care time spent reviewing labs/films, examining patient, collaborating with other physicians but excluding procedures for life threatening organ failure is 31 minutes.

## 2020-12-04 NOTE — PROGRESS NOTES
Inpatient Occupational Therapy  Evaluation and Treatment    Unit: ICU  Date:  12/4/2020  Patient Name:    Kapil Natarajan  Admitting diagnosis:  Pneumonia due to COVID-19 virus [U07.1, J12.89]  Admit Date:  11/26/2020  Precautions/Restrictions/WB Status/ Lines/ Wounds/ Oxygen: Fall risk, Lines -IV, Supplemental O2 (10L) and ICU monitoring, Telemetry, Continuous pulse oximetry and Isolation Precautions: Droplet Plus - COVID    Treatment Time:  4622-7491  Treatment Number: 1   Timed code treatment minutes 30 minutes   Total Treatment minutes:   40   minutes    Patient Goals for Therapy:  \" get out of here and get back to work \"      Discharge Recommendations: Home 24 hr assist initially and Elliott REES needs for discharge: Needs Met       Therapy recommendations for staff:   Stand by assist with use of No AD for all ambulation within room    History of Present Illness: Per H&P from Dr. Marella Lundborg:   The patient is a 77 y. o. male w hx of DMII, HTN, Hypothyroidism, who presents with SOB. Pt reports he first started feeling ill 2 weeks ago. Him and his daughter went to get tested albeit he is unable to tell me where he was tested and what the result of the test was. He was reportedly tested positive for COVID in [place] - I do not see the results in our system. He presented to the hospital with worsening dyspnea and cough. He was found to be profoundly hypoxic - he is on AIRVO now 35l/min with 75% FiO2. He feels a little better now.     Home Health S4 Level Recommendation:  Level 1 Standard  AM-PAC Score: AM-PAC Inpatient Daily Activity Raw Score: 20    Preadmission Environment    Pt.  Lives with spouse and granddaughter and infant great granddaughter some of the time, sometimes lives in [place] with sister-in-law while working but plans to return to home with wife while recuperating  Home environment:  split level home  Steps to enter first floor: 2 steps to enter, 2 steps down into sunken living room  Steps to second positioned in proper neutral alignment and pressure relief provided. Call light provided and all needs within reach    Exercise / Activities Initiated: NA  N/A     Pt completed 5 repetitive sit-stands from chair with SBA to address endurance. Patient/Family Education:   Role of OT  Recommendations for DC  Energy conservation techniques  Oxygen tubing management    Assessment of Deficits: Pt seen for Occupational therapy evaluation in acute care setting. Pt demonstrated decreased Activity tolerance, ADLs, IADLs and Transfers. Pt functioning below baseline and will likely benefit from skilled occupational therapy services to maximize safety and independence. Goal(s) : To be met in 3 Visits:  1). Bed to toilet/BSC: Supervision    To be met in 5 Visits:  1). Supine to/from Sit:  Supervision  2). Upper Body Bathing:   Supervision  3). Lower Body Bathing:   SBA  4). Upper Body Dressing:  Supervision  5). Lower Body Dressing:  SBA  6). Pt to demonstrate UE exs x 15 reps with minimal cues    Rehabilitation Potential:  Good for goals listed above. Strengths for achieving goals include: Pt motivated, PLOF, Family Support and Pt cooperative  Barriers to achieving goals include: Other: O2 requirements     Plan: To be seen 3-5 x/wk while in acute care setting for therapeutic exercises, bed mobility, transfers, dressing, bathing, family/patient education, ADL/IADL retraining, energy conservation training.      Tamara Palumbo OTR/L [id number]            If patient discharges from this facility prior to next visit, this note will serve as the Discharge Summary

## 2020-12-04 NOTE — PROGRESS NOTES
Inpatient Physical Therapy Evaluation and Treatment    Unit: ICU  Date:  12/4/2020  Patient Name:    Anmol Shields  Admitting diagnosis:  Pneumonia due to COVID-19 virus [U07.1, J12.89]  Admit Date:  11/26/2020  Precautions/Restrictions/WB Status/ Lines/ Wounds/ Oxygen:Fall risk, Lines -IV, Supplemental O2 (10L) and ICU monitoring, Telemetry, Continuous pulse oximetry and Isolation Precautions: Droplet Plus - COVID    Treatment Time:  3078-7904  Treatment Number:  1   Timed Code Treatment Minutes: 30 minutes  Total Treatment Minutes:  40  minutes    Patient Goals for Therapy: \" to get out of here and get back to work \"          Discharge Recommendations: Home PRN assist  and Home PT -   DME needs for discharge: Needs Met       Therapy recommendation for EMS Transport: NA    Therapy recommendations for staff:   Stand by assist with use of No AD for all ambulation within room    History of Present Illness:   Per H&P from Dr. Chris Taylor:   The patient is a 77 y.o. male w hx of DMII, HTN, Hypothyroidism, who presents with SOB. Pt reports he first started feeling ill 2 weeks ago. Him and his daughter went to get tested albeit he is unable to tell me where he was tested and what the result of the test was. He was reportedly tested [place] for COVID [place] [address] - I do not see the results in our system. He presented to the hospital with worsening dyspnea and cough. He was found to be profoundly hypoxic - he is on AIRVO now 35l/min with 75% FiO2. He feels a little better now. Home Health S4 Level Recommendation:  NA  AM-PAC Mobility Score    AM-PAC Inpatient Mobility Raw Score : 20     Preadmission Environment    Pt.  Lives with spouse and granddaughter and infant great granddaughter some of the time, sometimes lives in [facility] with sister-in-law while working but plans to return to home with wife while recuperating  Home environment:    split level home  Steps to enter first floor: 2 steps to enter, 2 steps down into sunken living room  Steps to second floor: Partial flight to 2nd level where granddaughter and great granddaughter live  Bathroom: walk in shower and comfort height toilet  Equipment owned: shower chair/bench and CPAP    Preadmission Status:  Pt. Able to drive: Yes  Pt Fully independent with ADLs: Yes  Pt. Required assistance from family for: Independent PTA  Pt. independent for transfers and gait and walked with No Device  History of falls No   Works full time at [employer] as a [employer]ff[employer]    Pain   No  Location:   Rating: NA /10  Pain Medicine Status: No request made    Cognition    A&O x4   Able to follow 2 step commands    Subjective  Patient sitting up in chair with no family present. Pt agreeable to this PT eval & tx. Upper Extremity ROM/Strength  Please see OT evaluation. Lower Extremity ROM / Strength   AROM WFL: Yes  ROM limitations:     Strength Assessment (measured on a 0-5 scale):  R LE   Quad   4   Ant Tib  4   Hamstring 4   Iliopsoas 4  L LE  Quad   4   Ant Tib  4   Hamstring 4   Iliopsoas 4    Lower Extremity Sensation    WFL    Lower Extremity Proprioception:   WFL    Coordination and Tone  WFL    Balance  Sitting:  Good ;  Independent  Comments:     Standing: Good - ; SBA  Comments: without device    Bed Mobility   Supine to Sit:    Not Tested  Sit to Supine:   Not Tested  Rolling:   Not Tested  Scooting in sitting: Not Tested  Scooting in supine:  Not Tested    Transfer Training     Sit to stand:   SBA  Stand to sit:   SBA  Bed to Chair:   Not Tested with use of N/A    Gait gait completed as indicated below  Distance:      20 ft  Deviations (firm surface/linoleum):  decreased nathalie and step through pattern  Assistive Device Used:    No AD  Level of Assist:    SBA  Comment: safe with turns; distance limited by fatigue and O2 saturation    Stair Training deferred, pt unsafe/ not appropriate to complete stairs at this time  # of Steps:   N/A  Level of Assist:  Not Tested  UE Support:  NA  Assistive Device:  N/A  Pattern:   N/A  Comments:     Activity Tolerance   Pt completed therapy session with Spo2 = 83% after ambulation. Pt required cues for PLB to improve O2 saturation to 90% after 2 min. Positioning Needs   Pt up in chair, no alarm needed, positioned in proper neutral alignment and pressure relief provided. Call light provided and all needs within reach    Exercises Initiated   Pt completed 5 repetitive sit-stands from chair with SBA    Other  None. Patient/Family Education   Pt educated on role of inpatient PT, POC, importance of continued activity, safety awareness, pursed lip breathing, energy conservation, pacing activity and calling for assist with mobility. Assessment  Pt seen for Physical Therapy evaluation in acute care setting. Pt demonstrated decreased Activity tolerance, Balance and Safety as well as decreased independence with Ambulation and Transfers. Recommending Home PRN assist and with home PT upon discharge as patient functioning below baseline level    Goals : To be met in 3 visits:  1). Independent with LE Ex x 10 reps    To be met in 6 visits:  1). Supine to/from sit: Independent  2). Sit to/from stand: Modified Independent  3). Bed to chair: Modified Independent  4). Gait: Ambulate 50 ft.  with  Modified Independent and use of No AD  5). Tolerate B LE exercises 3 sets of 10-15 reps    Rehabilitation Potential: Good  Strengths for achieving goals include:   Pt motivated, PLOF, Family Support and Pt cooperative   Barriers to achieving goals include:    Complexity of condition    Plan    To be seen 2-3 x / week  while in acute care setting for therapeutic exercises, bed mobility, transfers, progressive gait training, balance training, and family/patient education. Signature: Lilo Forrester [place] [id number]    If patient discharges from this facility prior to next visit, this note will serve as the Discharge Summary.

## 2020-12-04 NOTE — PROGRESS NOTES
Assessment complete. Blood pressure (!) 123/56, pulse 76, temperature 97.7 °F (36.5 °C), temperature source Axillary, resp. rate 20, height 5' 9\" (1.753 m), weight 242 lb 14.4 oz (110.2 kg), SpO2 91 %. Patient is alert and oriented x 4. Patient is able to stand and pivot to [facility] with no problems. Lung sounds clear and diminished. Occasional non-productive coughing noted. Patient is on 6 L high flow NC per order. When patient stands to get into bed or to sit on BSC o2 saturations drop to low 80's occasional 79%, but patient recovers quickly once seated. Bowel sounds positive x 4 quads. Abdomen soft non tender. No edema noted. Patient denies pain or discomfort at this time. Will continue to monitor throughout shift.    Electronically signed by Kiara Ryder RN on 12/3/2020 at 8:57 PM

## 2020-12-04 NOTE — PLAN OF CARE
Problem: Falls - Risk of:  Goal: Will remain free from falls  Description: Will remain free from falls  Outcome: Ongoing     Problem: Airway Clearance - Ineffective  Goal: Achieve or maintain patent airway  Outcome: Ongoing     Problem: Gas Exchange - Impaired  Goal: Absence of hypoxia  Outcome: Ongoing     Problem: Breathing Pattern - Ineffective  Goal: Ability to achieve and maintain a regular respiratory rate  Outcome: Ongoing

## 2020-12-04 NOTE — PROGRESS NOTES
Patient compliant with wearing Bi-pap throughout the night. Oxygen saturations remained above 93%. No complaints of SOB. Patient denies pain and discomfort. Respirations easy/even, Unlabored breathing. No concerns at this time. Will continue to  Monitor.

## 2020-12-05 LAB
A/G RATIO: 0.7 (ref 1.1–2.2)
ALBUMIN SERPL-MCNC: 3.2 G/DL (ref 3.4–5)
ALP BLD-CCNC: 67 U/L (ref 40–129)
ALT SERPL-CCNC: 30 U/L (ref 10–40)
ANION GAP SERPL CALCULATED.3IONS-SCNC: 9 MMOL/L (ref 3–16)
AST SERPL-CCNC: 19 U/L (ref 15–37)
BASOPHILS ABSOLUTE: 0 K/UL (ref 0–0.2)
BASOPHILS RELATIVE PERCENT: 0.3 %
BILIRUB SERPL-MCNC: 1 MG/DL (ref 0–1)
BUN BLDV-MCNC: 29 MG/DL (ref 7–20)
CALCIUM SERPL-MCNC: 9.2 MG/DL (ref 8.3–10.6)
CHLORIDE BLD-SCNC: 91 MMOL/L (ref 99–110)
CO2: 30 MMOL/L (ref 21–32)
CREAT SERPL-MCNC: 0.8 MG/DL (ref 0.8–1.3)
EOSINOPHILS ABSOLUTE: 0.1 K/UL (ref 0–0.6)
EOSINOPHILS RELATIVE PERCENT: 1.1 %
GFR AFRICAN AMERICAN: >60
GFR NON-AFRICAN AMERICAN: >60
GLOBULIN: 4.5 G/DL
GLUCOSE BLD-MCNC: 107 MG/DL (ref 70–99)
GLUCOSE BLD-MCNC: 133 MG/DL (ref 70–99)
GLUCOSE BLD-MCNC: 158 MG/DL (ref 70–99)
GLUCOSE BLD-MCNC: 166 MG/DL (ref 70–99)
GLUCOSE BLD-MCNC: 216 MG/DL (ref 70–99)
GLUCOSE BLD-MCNC: 225 MG/DL (ref 70–99)
HCT VFR BLD CALC: 39.5 % (ref 40.5–52.5)
HEMOGLOBIN: 13.5 G/DL (ref 13.5–17.5)
LYMPHOCYTES ABSOLUTE: 1 K/UL (ref 1–5.1)
LYMPHOCYTES RELATIVE PERCENT: 9.9 %
MCH RBC QN AUTO: 32.6 PG (ref 26–34)
MCHC RBC AUTO-ENTMCNC: 34.3 G/DL (ref 31–36)
MCV RBC AUTO: 95 FL (ref 80–100)
MONOCYTES ABSOLUTE: 1 K/UL (ref 0–1.3)
MONOCYTES RELATIVE PERCENT: 9.8 %
NEUTROPHILS ABSOLUTE: 7.9 K/UL (ref 1.7–7.7)
NEUTROPHILS RELATIVE PERCENT: 78.9 %
PDW BLD-RTO: 13.4 % (ref 12.4–15.4)
PERFORMED ON: ABNORMAL
PLATELET # BLD: 274 K/UL (ref 135–450)
PMV BLD AUTO: 6.7 FL (ref 5–10.5)
POTASSIUM SERPL-SCNC: 4.1 MMOL/L (ref 3.5–5.1)
RBC # BLD: 4.15 M/UL (ref 4.2–5.9)
SODIUM BLD-SCNC: 130 MMOL/L (ref 136–145)
TOTAL PROTEIN: 7.7 G/DL (ref 6.4–8.2)
WBC # BLD: 10 K/UL (ref 4–11)

## 2020-12-05 PROCEDURE — 99233 SBSQ HOSP IP/OBS HIGH 50: CPT | Performed by: INTERNAL MEDICINE

## 2020-12-05 PROCEDURE — 94660 CPAP INITIATION&MGMT: CPT

## 2020-12-05 PROCEDURE — 6360000002 HC RX W HCPCS: Performed by: INTERNAL MEDICINE

## 2020-12-05 PROCEDURE — 2060000000 HC ICU INTERMEDIATE R&B

## 2020-12-05 PROCEDURE — 97110 THERAPEUTIC EXERCISES: CPT

## 2020-12-05 PROCEDURE — 2580000003 HC RX 258: Performed by: INTERNAL MEDICINE

## 2020-12-05 PROCEDURE — 36415 COLL VENOUS BLD VENIPUNCTURE: CPT

## 2020-12-05 PROCEDURE — 99232 SBSQ HOSP IP/OBS MODERATE 35: CPT | Performed by: INTERNAL MEDICINE

## 2020-12-05 PROCEDURE — 97116 GAIT TRAINING THERAPY: CPT

## 2020-12-05 PROCEDURE — 85025 COMPLETE CBC W/AUTO DIFF WBC: CPT

## 2020-12-05 PROCEDURE — 97530 THERAPEUTIC ACTIVITIES: CPT

## 2020-12-05 PROCEDURE — 6370000000 HC RX 637 (ALT 250 FOR IP): Performed by: INTERNAL MEDICINE

## 2020-12-05 PROCEDURE — 80053 COMPREHEN METABOLIC PANEL: CPT

## 2020-12-05 RX ADMIN — INSULIN GLARGINE 22 UNITS: 100 INJECTION, SOLUTION SUBCUTANEOUS at 09:31

## 2020-12-05 RX ADMIN — INSULIN GLARGINE 22 UNITS: 100 INJECTION, SOLUTION SUBCUTANEOUS at 21:14

## 2020-12-05 RX ADMIN — INSULIN LISPRO 3 UNITS: 100 INJECTION, SOLUTION INTRAVENOUS; SUBCUTANEOUS at 13:00

## 2020-12-05 RX ADMIN — ENOXAPARIN SODIUM 40 MG: 40 INJECTION SUBCUTANEOUS at 09:30

## 2020-12-05 RX ADMIN — LOSARTAN POTASSIUM 50 MG: 25 TABLET, FILM COATED ORAL at 09:30

## 2020-12-05 RX ADMIN — INSULIN LISPRO 6 UNITS: 100 INJECTION, SOLUTION INTRAVENOUS; SUBCUTANEOUS at 17:16

## 2020-12-05 RX ADMIN — LEVOTHYROXINE SODIUM 100 MCG: 100 TABLET ORAL at 13:00

## 2020-12-05 RX ADMIN — FUROSEMIDE 40 MG: 10 INJECTION, SOLUTION INTRAMUSCULAR; INTRAVENOUS at 17:16

## 2020-12-05 RX ADMIN — Medication 5000 UNITS: at 09:30

## 2020-12-05 RX ADMIN — ENOXAPARIN SODIUM 40 MG: 40 INJECTION SUBCUTANEOUS at 21:14

## 2020-12-05 RX ADMIN — Medication 10 ML: at 21:14

## 2020-12-05 RX ADMIN — DEXAMETHASONE SODIUM PHOSPHATE 6 MG: 10 INJECTION, SOLUTION INTRAMUSCULAR; INTRAVENOUS at 09:30

## 2020-12-05 RX ADMIN — INSULIN LISPRO 3 UNITS: 100 INJECTION, SOLUTION INTRAVENOUS; SUBCUTANEOUS at 21:15

## 2020-12-05 RX ADMIN — FUROSEMIDE 40 MG: 10 INJECTION, SOLUTION INTRAMUSCULAR; INTRAVENOUS at 09:30

## 2020-12-05 NOTE — PROGRESS NOTES
Resting quietly with eyes closed & respirations WNL with Bi-PAP FIO2 70%. Call in easy reach. Continue to monitor closely.   Gayle Fleming RN

## 2020-12-05 NOTE — PROGRESS NOTES
Received patient from ICU to -2 in stable condition. Continuous pulse oximetry 92% on 9L HFNC. Orientation provided. Instructed to call for assist before attempting out of bed & patient verbalized good understanding. Patient with scattered bruising & declines head to toe skin assessment. Patient is able to demonstrate the ability to move from a reclining position to an upright position within the recliner. Call in easy reach. Continue to monitor closely.   Franklin Paniagua RN

## 2020-12-05 NOTE — PROGRESS NOTES
Occupational Therapy Daily Treatment Note    Unit: PCU  Date:  12/5/2020  Patient Name:    Angela Han  Admitting diagnosis:  Pneumonia due to COVID-19 virus [U07.1, J12.89]  Admit Date:  11/26/2020  Precautions/Restrictions:  Fall risk, Lines -IV and Supplemental O2 (9L), Telemetry, Continuous pulse oximetry and Isolation Precautions: Droplet Plus - COVID        Discharge Recommendations: Home 24 hr assist  and Home OT  DME needs for discharge: Needs Met       Therapy recommendations for staff:   Assist of 1 with use of gait belt for all transfers and ambulation within room    AM-PAC Score: AM-PAC Inpatient Daily Activity Raw Score: 19  Home Health S4 Level: Level 1- Standard       Treatment Time:  6975-5328  Treatment number:  2    Timed code treatment minutes: 35 minutes  Total treatment minutes:   35 minutes    History of Present Illness: Per H&P from Dr. Vega Roberts  The patient is K 60 y. o. male w hx of DMII, HTN, Hypothyroidism, who presents with SOB. Pt reports he first started feeling ill 2 weeks ago. Him and his daughter went to get tested albeit he is unable to tell me where he was tested and what the result of the test was. He was reportedly tested positive for COVID in [place] - I do not see the results in our system. He presented to the hospital with worsening dyspnea and cough. He was found to be profoundly hypoxic - he is on AIRVO now 35l/min with 75% FiO2. He feels a little better now.     Subjective:  Patient up in chair and agreeable to therapy.     Pain   No  Rating: NA  Location:  Pain Medicine Status: No request made      Bed Mobility:   Supine to Sit:  Not Tested  Sit to Supine:  Not Tested  Rolling:           Not Tested  Scooting:        SBA    Transfer Training:   Sit to stand:   SBA  Stand to sit:  SBA  Bed to Chair:  CGA  Bed to [facility]:   Not Tested  Standard toilet:   Not Tested     Completed functional mobility in room x 5 laps with CGA, two near [facility] (MIN A to correct) when changing directions. Safe technique for gradually changing directions provided. Activity Tolerance   Pt completed therapy session with Spo2 = 86% after functional mobility in room, improved to 90s on 9L with cues for PLB and 2 min rest    ADL Training:   Upper body dressing: Not Tested  Upper body bathing:  Not Tested  Lower body dressing:  Not Tested  Lower body bathing:  Not Tested  Toileting:   Not Tested  Grooming/Hygiene:  Not Tested    Therapeutic Exercise: all completed bilaterally unless indicated  Hand flex/ext:  x10  Wrist flex/ext:  x10  Elbow flex/ext: x10  Shoulder flex/ext:  x10  Arm circles at 90 degrees CW/CCW:  x10    Recommended completing on own during commercials for HEP. Patient Education:   Role of OT  Recommendations for DC  Energy conservation techniques  Oxygen tubing management   UE AROM HEP    Positioning Needs:   Pt up in chair, no alarm needed, positioned in proper neutral alignment and pressure relief provided. Call light provided and all needs within reach    Family Present:  No    Assessment: Making progress, limited by SOB and requires cues for safety. GOALS  To be met in 3 Visits:  1). Bed to toilet/BSC: Supervision     To be met in 5 Visits:  1). Supine to/from Sit:             Supervision  2). Upper Body Bathing:         Supervision  3). Lower Body Bathing:         SBA  4). Upper Body Dressing:       Supervision  5). Lower Body Dressing:       SBA  6).  Pt to demonstrate UE exs x 15 reps with minimal cues      Plan: cont with [address], OTR/L 0052        If patient discharges from this facility prior to next visit, this note will serve as the Discharge Summary

## 2020-12-05 NOTE — PROGRESS NOTES
Report given to iLdia Dietz RN at bedside for transfer of care. All pt's belongings collected and on counter. Pt meds given to Lidia Dietz RN at bedside. Pt placed on portable monitor, request for transport sent.   Juhi Kimball RN

## 2020-12-05 NOTE — FLOWSHEET NOTE
12/05/20 1510   Vital Signs   Temp 97.3 °F (36.3 °C)   Temp Source Oral   Pulse 70   Heart Rate Source Monitor   Resp 20   BP (!) 109/48   BP Location Left upper arm   Level of Consciousness Alert (0)   MEWS Score 1   Patient Currently in Pain Denies   Oxygen Therapy   SpO2 94 %   O2 Device High flow nasal cannula   O2 Flow Rate (L/min) 9 L/min   Up to chair.

## 2020-12-05 NOTE — PROGRESS NOTES
Hospitalist Progress Note      PCP: No primary care provider on file. Date of Admission: 11/26/2020    Chief Complaint: SOB. The patient is a 77 y.o. male w hx of DMII, HTN, Hypothyroidism, who presents with SOB.      Pt reports he first started feeling ill 2 weeks ago. Him and his daughter went to get tested albeit he is unable to tell me where he was tested and what the result of the test was.      He was reportedly tested positive for COVID [place] [address] - I do not see the results in our system. Confirmed here on 11/27     He presented to the hospital with worsening dyspnea and cough. He was found to be profoundly hypoxic - on AIRVO. 11/28  oxygenation worse. Vapotherm, to ICU     Subjective:       11/30  Remains on AirVO. Seen sitting up in chair, feels better today and used bipap last night  Watching ball game today     12/1  - pt not seen at bedside.  Up in room , sitting in chair again today   Denies any issues  Comfortable on AirVo vapotherm    12/2  Airvo 40L with FiO2 48%  BiPAP overnight   Afebrile   VSS    12/3- pt not seen at bedside  Remains on 10 L oxygen  Remains active in room   Denies any issues    12/4 - pt seen through glass doors  Remains active in room   Sitting up in chair , watching TV, \"thumbs up \"  Now on 9 L       12/5 - pt seen at bedside, used bipap last night   (Has home bipap)   Now on 9 l oxygen   Getting bored of watching TV all the time      Medications:  Reviewed    Infusion Medications    dextrose       Scheduled Medications    furosemide  40 mg Intravenous BID    insulin glargine  22 Units Subcutaneous BID    insulin lispro  0-18 Units Subcutaneous TID WC    insulin lispro  0-9 Units Subcutaneous Nightly    enoxaparin  40 mg Subcutaneous BID    vitamin D3  5,000 Units Oral Daily    levothyroxine  100 mcg Oral Daily    losartan  50 mg Oral Daily    sodium chloride flush  10 mL Intravenous 2 times per day    dexamethasone  6 mg Intravenous Daily     PRN Meds: sennosides-docusate sodium, sodium chloride flush, acetaminophen **OR** acetaminophen, polyethylene glycol, promethazine **OR** ondansetron, sodium chloride, glucose, dextrose, glucagon (rDNA), dextrose      Intake/Output Summary (Last 24 hours) at 12/5/2020 0749  Last data filed at 12/5/2020 0244  Gross per 24 hour   Intake 960 ml   Output 1275 ml   Net -315 ml       Physical Exam Performed:    /75   Pulse 68   Temp 98 °F (36.7 °C) (Oral)   Resp 15   Ht 5' 9\" (1.753 m)   Wt 213 lb 2 oz (96.7 kg)   SpO2 94%   BMI 31.47 kg/m²     Pt not seen at bedside        General: elderly male up in bed, no distress   Awake, alert and oriented. Appears to be not in any distress  Mucous Membranes:  Pink , anicteric  Neck: No JVD, no carotid bruit, no thyromegaly  Chest:  Diminished in bases with scattered crackles  Cardiovascular:  RRR S1S2 heard, no murmurs or gallops  Abdomen:  Soft, undistended, non tender, no organomegaly, BS present  Extremities: No edema or cyanosis. Distal pulses well felt  Neurological : grossly normal        Labs:   Recent Labs     12/03/20  0443 12/04/20  0423 12/05/20  0502   WBC 9.4 10.8 10.0   HGB 13.2* 13.4* 13.5   HCT 38.2* 38.5* 39.5*    310 274     Recent Labs     12/04/20  0422 12/05/20  0502   * 130*   K 3.9 4.1   CL 93* 91*   CO2 26 30   BUN 23* 29*   CREATININE 0.8 0.8   CALCIUM 9.1 9.2     Recent Labs     12/04/20  0422 12/05/20  0502   AST 20 19   ALT 29 30   BILITOT 0.6 1.0   ALKPHOS 67 67     No results for input(s): INR in the last 72 hours. No results for input(s): Reyne Maori in the last 72 hours. Urinalysis:    No results found for: Hugo Parent, BACTERIA, RBCUA, BLOODU, [address]    Radiology:  XR CHEST 1 VIEW   Final Result   Patchy airspace opacities suspicious for pneumonia. An atypical or viral   pneumonia is suspected.                  Assessment/Plan:    Active Hospital Problems    Diagnosis    Steroid-induced hyperglycemia [R73.9, T38.0X5A]    Acute respiratory failure with hypoxia (HCC) [J96.01]    Pneumonia due to COVID-19 virus [U07.1, J12.89]             Assessment/Plan:         Active Hospital Problems     Diagnosis    Steroid-induced hyperglycemia [R73.9, T38.0X5A]    Acute respiratory failure with hypoxia (HCC) [J96.01]    Pneumonia due to COVID-19 virus [U07.1, J12.89]         Acute Hypox Resp Failure   2/2 COVID PNA - positive 11/27    - S/p CCP 11/26 1 unit  - Lovenox 40BID. - Remdesivir started completed x5 days  - Dexamethasone started 11/27, D#9  - LASIX 40 IV BID started per Pulmonary- tolerating well    stable status today . Pt ok for intubation if needed   - off vapotherm on 12L > 9 L NC HFNC     DMII with steroid hyperglycemia    - Increased lantus to 20 BID  - ISS to high dose  - BG in 300s, improved now      Hypothyroid   - resumed synthroid.      HTN   - well controlled  - remains on losartan.      - Hold HCTZ with hyponatremia - hyponatremia improving (131)    MADISYN - on home bipap     DVT Prophylaxis: lovenox BID  Diet: DIET CARB CONTROL;  Code Status: Full Code     Dispo: cont care  Up in chair    Dc once oxygenation improves     Jimmie Rosario MD 12/5/2020 7:49 AM

## 2020-12-05 NOTE — PROGRESS NOTES
12/04/20 2149   NIV Type   $NIV $Daily Charge   Skin Assessment Clean, dry, & intact   Skin Protection for O2 Device Yes   Equipment Type v60   Mode Bilevel   Mask Type Full face mask   Mask Size Large   Settings/Measurements   IPAP 16 cmH20   CPAP/EPAP 8 cmH2O   Rate Ordered 12   Resp 18   FiO2  70 %   Vt Exhaled 867 mL   Minute Volume 16.1 Liters   Mask Leak (lpm) 2 lpm   Comfort Level Good   Using Accessory Muscles No   SpO2 97   Patient Observation   Observations spo2 97% on 70% bipap

## 2020-12-05 NOTE — FLOWSHEET NOTE
12/05/20 0924   Vital Signs   Temp 97.6 °F (36.4 °C)   Temp Source Oral   Pulse 82   Heart Rate Source Monitor   Resp 16   /66   BP Location Right upper arm   Level of Consciousness Alert (0)   MEWS Score 1   Patient Currently in Pain Denies   Oxygen Therapy   SpO2 90 %   Pulse Oximeter Device Mode Continuous   Pulse Oximeter Device Location Right;Finger   O2 Device High flow nasal cannula   O2 Flow Rate (L/min) 9 L/min   Assessment completed and documented VSS, SR on monitor and remains on 9L HF.

## 2020-12-05 NOTE — PROGRESS NOTES
12/05/20 0350   NIV Type   Equipment Type v60   Mode Bilevel   Mask Type Full face mask   Mask Size Large   Settings/Measurements   IPAP 16 cmH20   CPAP/EPAP 8 cmH2O   Rate Ordered 12   Resp 15   FiO2  70 %   Vt Exhaled 752 mL   Minute Volume 11.1 Liters   Mask Leak (lpm) 30 lpm   Comfort Level Good   Using Accessory Muscles No   SpO2 96   Patient Observation   Observations spo2 96% on 70% bipap

## 2020-12-05 NOTE — PROGRESS NOTES
Pulmonary & Critical Care Medicine  Progress Note    CC: COVID 19 pneumonia    Events of Last 24 hours:   Patient feels about the same. Invasive Lines: IV:     MV:   Vent Mode: AC/VC Rate Set: 16 bmp/Vt Ordered: 450 mL/ /FiO2 : 70 %  No results for input(s): PHART, CXB2QNM, PO2ART in the last 72 hours. IV:   dextrose       Vitals:  /75   Pulse 68   Temp 98 °F (36.7 °C) (Oral)   Resp 15   Ht 5' 9\" (1.753 m)   Wt 213 lb 2 oz (96.7 kg)   SpO2 94%   BMI 31.47 kg/m²   on 9L     Intake/Output Summary (Last 24 hours) at 12/5/2020 0553  Last data filed at 12/5/2020 0244  Gross per 24 hour   Intake 960 ml   Output 1275 ml   Net -315 ml     EXAM:  Constitutional: chronically ill appearing. NCAT  Eyes: PERRL. No sclera icterus. ENT: Normal Nose. Normal Tongue. Neck:  Trachea is midline. No thyroid tenderness. Respiratory: No accessory muscle usage. No increase in resp distress; few basilar crackles. Cardiovascular: No JVD, No LE edema. GI: Non-distended no hernia  Neuro: Alert. Follows commands. moves all extremities. Sitting up in bed.     Scheduled Meds:   furosemide  40 mg Intravenous BID    insulin glargine  22 Units Subcutaneous BID    insulin lispro  0-18 Units Subcutaneous TID WC    insulin lispro  0-9 Units Subcutaneous Nightly    enoxaparin  40 mg Subcutaneous BID    vitamin D3  5,000 Units Oral Daily    levothyroxine  100 mcg Oral Daily    losartan  50 mg Oral Daily    sodium chloride flush  10 mL Intravenous 2 times per day    dexamethasone  6 mg Intravenous Daily     PRN Meds:  sennosides-docusate sodium, sodium chloride flush, acetaminophen **OR** acetaminophen, polyethylene glycol, promethazine **OR** ondansetron, sodium chloride, glucose, dextrose, glucagon (rDNA), dextrose    Results:  CBC:   Recent Labs     12/03/20 0443 12/04/20 0423   WBC 9.4 10.8   HGB 13.2* 13.4*   HCT 38.2* 38.5*   MCV 94.3 94.0    310     BMP:   Recent Labs     12/04/20 0422   * K 3.9   CL 93*   CO2 26   BUN 23*   CREATININE 0.8     LIVER PROFILE:   Recent Labs     12/04/20  0422   AST 20   ALT 29   BILITOT 0.6   ALKPHOS 79       Micro:  11/27 sars Cov2- detected on pcr         Chest imaging was reviewed by me and showed CXR 11/26  Patchy airspace opacities suspicious for pneumonia.  An atypical or viral    pneumonia is suspected             ASSESSMENT:  · COVID 19 pneumonia (tested in [place] and also had + rapid on admission)  · Acute hypoxic respiratory failure  · HTN  · DM2     PLAN:  · High flow supplemental oxygen  to keep Sao2 >90%. Bipap while asleep.   · Supplemental oxygen to maintain SaO2 >92%; wean as tolerated   · s/p 1 u CCP  · completed Remdesivir day #5; monitor LFT and renal  · Decadron day #10; monitor glucose  · Lasix 40mg IV bid  · SSI/lantus 22 U   · Follow CMP  · Lovenox BID  · Full code  · Full diet  · Senna - S  · PT/OT

## 2020-12-05 NOTE — PROGRESS NOTES
Inpatient Physical Therapy Daily Treatment Note    Unit: PCU  Date:  12/5/2020  Patient Name:    Angela Han  Admitting diagnosis:  Pneumonia due to COVID-19 virus [U07.1, J12.89]  Admit Date:  11/26/2020  Precautions/Restrictions: Fall risk, Lines -IV, Supplemental O2 (10L) and ICU monitoring, Telemetry, Continuous pulse oximetry and Isolation Precautions: Droplet Plus - COVID      Discharge Recommendations: Home PRN assist and with home PT   DME needs for discharge: Needs Met       Therapy recommendation for EMS Transport: NA    Therapy recommendations for staff:   Stand by assist with use of No AD for all ambulation within room    History of Present Illness: Per H&P from Dr. Fang Son:   The patient is a 77 y. o. male w hx of DMII, HTN, Hypothyroidism, who presents with SOB. Pt reports he first started feeling ill 2 weeks ago. Him and his daughter went to get tested albeit he is unable to tell me where he was tested and what the result of the test was. He was reportedly tested positive for COVID in [place] - I do not see the results in our system. He presented to the hospital with worsening dyspnea and cough. He was found to be profoundly hypoxic - he is on AIRVO now 35l/min with 75% FiO2. He feels a little better now.     Home Health S4 Level Recommendation: NA  AM-PAC Mobility Score   AM-PAC Inpatient Mobility Raw Score : 20       Treatment Time:  13:25 - 13:50  Treatment number: 2  Timed Code Treatment Minutes: 25 minutes  Total Treatment Minutes:  25  minutes    Cognition    A&O x4   Able to follow 2 step commands    Subjective  Patient sitting up in chair with no family present   Pt agreeable to this PT tx.      Pain   No  Location:   Rating:    NA/10  Pain Medicine Status: Denies need     Bed Mobility   Supine to Sit:    Not Tested  Sit to Supine:   Not Tested  Rolling:   Not Tested  Scooting:   Modified Independent in chair    Transfer Training     Sit to stand:   SBA  Stand to sit:   SBA  Bed to Sit to/from stand: Modified Independent  3). Bed to chair: Modified Independent  4). Gait: Ambulate 50 ft.  with  Modified Independent and use of No AD  5). Tolerate B LE exercises 3 sets of 10-15 reps    Plan   Continue with plan of care. Signature: Delaney Hatfield, PT, DPT, OMT-C  #[phone]    If patient discharges from this facility prior to next visit, this note will serve as the Discharge Summary.

## 2020-12-06 LAB
A/G RATIO: 0.8 (ref 1.1–2.2)
ALBUMIN SERPL-MCNC: 3 G/DL (ref 3.4–5)
ALP BLD-CCNC: 62 U/L (ref 40–129)
ALT SERPL-CCNC: 30 U/L (ref 10–40)
ANION GAP SERPL CALCULATED.3IONS-SCNC: 11 MMOL/L (ref 3–16)
AST SERPL-CCNC: 19 U/L (ref 15–37)
BASOPHILS ABSOLUTE: 0 K/UL (ref 0–0.2)
BASOPHILS RELATIVE PERCENT: 0.4 %
BILIRUB SERPL-MCNC: 1 MG/DL (ref 0–1)
BUN BLDV-MCNC: 26 MG/DL (ref 7–20)
CALCIUM SERPL-MCNC: 8.7 MG/DL (ref 8.3–10.6)
CHLORIDE BLD-SCNC: 91 MMOL/L (ref 99–110)
CO2: 27 MMOL/L (ref 21–32)
CREAT SERPL-MCNC: 0.7 MG/DL (ref 0.8–1.3)
EOSINOPHILS ABSOLUTE: 0.1 K/UL (ref 0–0.6)
EOSINOPHILS RELATIVE PERCENT: 1.1 %
GFR AFRICAN AMERICAN: >60
GFR NON-AFRICAN AMERICAN: >60
GLOBULIN: 4 G/DL
GLUCOSE BLD-MCNC: 129 MG/DL (ref 70–99)
GLUCOSE BLD-MCNC: 139 MG/DL (ref 70–99)
GLUCOSE BLD-MCNC: 196 MG/DL (ref 70–99)
GLUCOSE BLD-MCNC: 212 MG/DL (ref 70–99)
GLUCOSE BLD-MCNC: 274 MG/DL (ref 70–99)
HCT VFR BLD CALC: 39.2 % (ref 40.5–52.5)
HEMOGLOBIN: 13.4 G/DL (ref 13.5–17.5)
LYMPHOCYTES ABSOLUTE: 1.1 K/UL (ref 1–5.1)
LYMPHOCYTES RELATIVE PERCENT: 12.5 %
MCH RBC QN AUTO: 32.5 PG (ref 26–34)
MCHC RBC AUTO-ENTMCNC: 34.3 G/DL (ref 31–36)
MCV RBC AUTO: 94.9 FL (ref 80–100)
MONOCYTES ABSOLUTE: 0.8 K/UL (ref 0–1.3)
MONOCYTES RELATIVE PERCENT: 9.3 %
NEUTROPHILS ABSOLUTE: 6.5 K/UL (ref 1.7–7.7)
NEUTROPHILS RELATIVE PERCENT: 76.7 %
PDW BLD-RTO: 13.4 % (ref 12.4–15.4)
PERFORMED ON: ABNORMAL
PLATELET # BLD: 248 K/UL (ref 135–450)
PMV BLD AUTO: 6.9 FL (ref 5–10.5)
POTASSIUM SERPL-SCNC: 3.9 MMOL/L (ref 3.5–5.1)
RBC # BLD: 4.13 M/UL (ref 4.2–5.9)
SODIUM BLD-SCNC: 129 MMOL/L (ref 136–145)
TOTAL PROTEIN: 7 G/DL (ref 6.4–8.2)
WBC # BLD: 8.5 K/UL (ref 4–11)

## 2020-12-06 PROCEDURE — 6360000002 HC RX W HCPCS: Performed by: INTERNAL MEDICINE

## 2020-12-06 PROCEDURE — 36415 COLL VENOUS BLD VENIPUNCTURE: CPT

## 2020-12-06 PROCEDURE — 94761 N-INVAS EAR/PLS OXIMETRY MLT: CPT

## 2020-12-06 PROCEDURE — 99233 SBSQ HOSP IP/OBS HIGH 50: CPT | Performed by: INTERNAL MEDICINE

## 2020-12-06 PROCEDURE — 6370000000 HC RX 637 (ALT 250 FOR IP): Performed by: INTERNAL MEDICINE

## 2020-12-06 PROCEDURE — 94660 CPAP INITIATION&MGMT: CPT

## 2020-12-06 PROCEDURE — 99232 SBSQ HOSP IP/OBS MODERATE 35: CPT | Performed by: INTERNAL MEDICINE

## 2020-12-06 PROCEDURE — 2700000000 HC OXYGEN THERAPY PER DAY

## 2020-12-06 PROCEDURE — 2580000003 HC RX 258: Performed by: INTERNAL MEDICINE

## 2020-12-06 PROCEDURE — 2060000000 HC ICU INTERMEDIATE R&B

## 2020-12-06 PROCEDURE — 80053 COMPREHEN METABOLIC PANEL: CPT

## 2020-12-06 PROCEDURE — 85025 COMPLETE CBC W/AUTO DIFF WBC: CPT

## 2020-12-06 RX ORDER — DEXAMETHASONE SODIUM PHOSPHATE 4 MG/ML
4 INJECTION, SOLUTION INTRA-ARTICULAR; INTRALESIONAL; INTRAMUSCULAR; INTRAVENOUS; SOFT TISSUE DAILY
Status: DISCONTINUED | OUTPATIENT
Start: 2020-12-07 | End: 2020-12-08

## 2020-12-06 RX ADMIN — INSULIN GLARGINE 22 UNITS: 100 INJECTION, SOLUTION SUBCUTANEOUS at 21:40

## 2020-12-06 RX ADMIN — DEXAMETHASONE SODIUM PHOSPHATE 6 MG: 10 INJECTION, SOLUTION INTRAMUSCULAR; INTRAVENOUS at 08:01

## 2020-12-06 RX ADMIN — FUROSEMIDE 40 MG: 10 INJECTION, SOLUTION INTRAMUSCULAR; INTRAVENOUS at 08:02

## 2020-12-06 RX ADMIN — LOSARTAN POTASSIUM 50 MG: 25 TABLET, FILM COATED ORAL at 14:47

## 2020-12-06 RX ADMIN — Medication 10 ML: at 08:05

## 2020-12-06 RX ADMIN — LEVOTHYROXINE SODIUM 100 MCG: 100 TABLET ORAL at 08:01

## 2020-12-06 RX ADMIN — Medication 5000 UNITS: at 08:01

## 2020-12-06 RX ADMIN — Medication 10 ML: at 21:40

## 2020-12-06 RX ADMIN — INSULIN LISPRO 6 UNITS: 100 INJECTION, SOLUTION INTRAVENOUS; SUBCUTANEOUS at 17:41

## 2020-12-06 RX ADMIN — INSULIN LISPRO 5 UNITS: 100 INJECTION, SOLUTION INTRAVENOUS; SUBCUTANEOUS at 21:44

## 2020-12-06 RX ADMIN — Medication 10 ML: at 17:38

## 2020-12-06 RX ADMIN — INSULIN GLARGINE 22 UNITS: 100 INJECTION, SOLUTION SUBCUTANEOUS at 08:21

## 2020-12-06 RX ADMIN — Medication 10 ML: at 08:04

## 2020-12-06 RX ADMIN — INSULIN LISPRO 3 UNITS: 100 INJECTION, SOLUTION INTRAVENOUS; SUBCUTANEOUS at 11:50

## 2020-12-06 RX ADMIN — ENOXAPARIN SODIUM 40 MG: 40 INJECTION SUBCUTANEOUS at 08:00

## 2020-12-06 RX ADMIN — ENOXAPARIN SODIUM 40 MG: 40 INJECTION SUBCUTANEOUS at 21:40

## 2020-12-06 NOTE — FLOWSHEET NOTE
12/06/20 1735   Vitals   Pulse 73   BP (!) 96/52       Perfect Serve Dr. Josselin Gomez; evening lasix held and discontinued per telephone order.

## 2020-12-06 NOTE — PROGRESS NOTES
12/06/20 0357   NIV Type   Equipment Type v60   Mode Bilevel   Mask Type Full face mask   Mask Size Large   Settings/Measurements   IPAP 16 cmH20   CPAP/EPAP 8 cmH2O   Rate Ordered 12   FiO2  70 %   Vt Exhaled 758 mL   Minute Volume 11.2 Liters   Mask Leak (lpm) 23 lpm   Comfort Level Good   Using Accessory Muscles No   SpO2 96

## 2020-12-06 NOTE — PROGRESS NOTES
End of shift report given to Tucker Cole. Pt in stable condition, care transferred at this time.  Electronically signed by Dylan Monroy RN on 12/6/2020 at 7:03 AM

## 2020-12-06 NOTE — PROGRESS NOTES
Pulmonary & Critical Care Medicine  Progress Note    CC: COVID 19 pneumonia    Events of Last 24 hours:   Patient feels better. Invasive Lines: IV:       Vitals:  /63   Pulse 67   Temp 96.3 °F (35.7 °C) (Oral)   Resp 16   Ht 5' 9\" (1.753 m)   Wt 214 lb 4.8 oz (97.2 kg)   SpO2 92%   BMI 31.65 kg/m²   on 9L     Intake/Output Summary (Last 24 hours) at 12/6/2020 0833  Last data filed at 12/6/2020 0805  Gross per 24 hour   Intake 624 ml   Output 2525 ml   Net -1901 ml     EXAM:  Constitutional: chronically ill appearing. NCAT  Eyes: PERRL. No sclera icterus. ENT: Normal Nose. Normal Tongue. Neck:  Trachea is midline. No thyroid tenderness. Respiratory: No accessory muscle usage. No increase in resp distress; few basilar crackles. Cardiovascular: No JVD, No LE edema. GI: Non-distended no hernia  Neuro: Alert. Follows commands. moves all extremities. Sitting up in bed. Conversant.      Scheduled Meds:   furosemide  40 mg Intravenous BID    insulin glargine  22 Units Subcutaneous BID    insulin lispro  0-18 Units Subcutaneous TID WC    insulin lispro  0-9 Units Subcutaneous Nightly    enoxaparin  40 mg Subcutaneous BID    vitamin D3  5,000 Units Oral Daily    levothyroxine  100 mcg Oral Daily    losartan  50 mg Oral Daily    sodium chloride flush  10 mL Intravenous 2 times per day    dexamethasone  6 mg Intravenous Daily     PRN Meds:  sennosides-docusate sodium, sodium chloride flush, acetaminophen **OR** acetaminophen, polyethylene glycol, promethazine **OR** ondansetron, sodium chloride, glucose, dextrose, glucagon (rDNA), dextrose    Results:  CBC:   Recent Labs     12/04/20  0423 12/05/20  0502 12/06/20  0436   WBC 10.8 10.0 8.5   HGB 13.4* 13.5 13.4*   HCT 38.5* 39.5* 39.2*   MCV 94.0 95.0 94.9    274 248     BMP:   Recent Labs     12/04/20  0422 12/05/20  0502 12/06/20  0436   * 130* 129*   K 3.9 4.1 3.9   CL 93* 91* 91*   CO2 26 30 27   BUN 23* 29* 26*   CREATININE 0.8 0.8 0.7*     LIVER PROFILE:   Recent Labs     12/04/20  0422 12/05/20  0502 12/06/20  0436   AST 20 19 19   ALT 29 30 30   BILITOT 0.6 1.0 1.0   ALKPHOS 67 67 62       Micro:  11/27 sars Cov2- detected on pcr         Chest imaging was reviewed by me and showed CXR 11/26  Patchy airspace opacities suspicious for pneumonia.  An atypical or viral    pneumonia is suspected             ASSESSMENT:  · COVID 19 pneumonia (tested in [place] and also had + rapid on admission)  · Acute hypoxic respiratory failure  · HTN  · DM2     PLAN:  · High flow supplemental oxygen  to keep Sao2 >90%. Bipap while asleep.   · Supplemental oxygen to maintain SaO2 >92%; wean as tolerated   · s/p 1 u CCP  · completed Remdesivir day #5; monitor LFT and renal  · Decadron day completed 10 days; monitor glucose- reduce dose today  · Lasix 40mg IV bid  · SSI/lantus 22 U   · Full code  · Full diet  · Senna - S  · PT/OT

## 2020-12-06 NOTE — PROGRESS NOTES
Shift assessment complete. Call light and bedside table within reach.  Electronically signed by Loree Busby RN on 12/5/2020 at 9:13 PM

## 2020-12-06 NOTE — PROGRESS NOTES
Hospitalist Progress Note      PCP: No primary care provider on file. Date of Admission: 11/26/2020    Chief Complaint: SOB. The patient is a 77 y.o. male w hx of DMII, HTN, Hypothyroidism, who presents with SOB.      Pt reports he first started feeling ill 2 weeks ago. Him and his daughter went to get tested albeit he is unable to tell me where he was tested and what the result of the test was.      He was reportedly tested positive for COVID in [place] - I do not see the results in our system. Confirmed here on 11/27     He presented to the hospital with worsening dyspnea and cough. He was found to be profoundly hypoxic - on AIRVO. 11/28  oxygenation worse. Vapotherm, to ICU     11/30 - 12/3  Remains on AirVO.    Seen sitting up in chair, feels better today and used bipap last night  Watching ball game today     12/4- switched to 10 L Nc,     126- remains on 9 L       Subjective:      pt seen at bedside, used bipap last night   (Has home bipap)   Now on 9 l oxygen     Medications:  Reviewed    Infusion Medications    dextrose       Scheduled Medications    furosemide  40 mg Intravenous BID    insulin glargine  22 Units Subcutaneous BID    insulin lispro  0-18 Units Subcutaneous TID WC    insulin lispro  0-9 Units Subcutaneous Nightly    enoxaparin  40 mg Subcutaneous BID    vitamin D3  5,000 Units Oral Daily    levothyroxine  100 mcg Oral Daily    losartan  50 mg Oral Daily    sodium chloride flush  10 mL Intravenous 2 times per day    dexamethasone  6 mg Intravenous Daily     PRN Meds: sennosides-docusate sodium, sodium chloride flush, acetaminophen **OR** acetaminophen, polyethylene glycol, promethazine **OR** ondansetron, sodium chloride, glucose, dextrose, glucagon (rDNA), dextrose      Intake/Output Summary (Last 24 hours) at 12/6/2020 0756  Last data filed at 12/6/2020 0644  Gross per 24 hour   Intake 624 ml   Output 2325 ml   Net -1701 ml       Physical Exam Performed:    /62 Pulse 67   Temp 97.6 °F (36.4 °C) (Oral)   Resp 18   Ht 5' 9\" (1.753 m)   Wt 214 lb 4.8 oz (97.2 kg)   SpO2 92%   BMI 31.65 kg/m²     Pt not seen at bedside        General: elderly male up in bed, no distress   Awake, alert and oriented. Appears to be not in any distress  Mucous Membranes:  Pink , anicteric  Neck: No JVD, no carotid bruit, no thyromegaly  Chest:  Improving air entry  in bases with resolving crackles  Cardiovascular:  RRR S1S2 heard, no murmurs or gallops  Abdomen:  Soft, undistended, non tender, no organomegaly, BS present  Extremities: No edema or cyanosis. Distal pulses well felt  Neurological : grossly normal        Labs:   Recent Labs     12/04/20 0423 12/05/20  0502 12/06/20  0436   WBC 10.8 10.0 8.5   HGB 13.4* 13.5 13.4*   HCT 38.5* 39.5* 39.2*    274 248     Recent Labs     12/04/20 0422 12/05/20  0502 12/06/20  0436   * 130* 129*   K 3.9 4.1 3.9   CL 93* 91* 91*   CO2 26 30 27   BUN 23* 29* 26*   CREATININE 0.8 0.8 0.7*   CALCIUM 9.1 9.2 8.7     Recent Labs     12/04/20 0422 12/05/20  0502 12/06/20  0436   AST 20 19 19   ALT 29 30 30   BILITOT 0.6 1.0 1.0   ALKPHOS 67 67 62     No results for input(s): INR in the last 72 hours. No results for input(s): Towana Ball in the last 72 hours. Urinalysis:    No results found for: Campoverde Goldmann, BACTERIA, RBCUA, BLOODU, [address]    Radiology:  XR CHEST 1 VIEW   Final Result   Patchy airspace opacities suspicious for pneumonia. An atypical or viral   pneumonia is suspected.                  Assessment/Plan:    Active Hospital Problems    Diagnosis    Steroid-induced hyperglycemia [R73.9, T38.0X5A]    Acute respiratory failure with hypoxia (HCC) [J96.01]    Pneumonia due to COVID-19 virus [U07.1, J12.89]             Assessment/Plan:         Active Hospital Problems     Diagnosis    Steroid-induced hyperglycemia [R73.9, T38.0X5A]    Acute respiratory failure with hypoxia ([facility] [address] [J96.01]    Pneumonia due to COVID-19 virus [U07.1, J12.89]         Acute Hypox Resp Failure   2/2 COVID PNA - positive 11/27    - S/p CCP 11/26 1 unit  - Lovenox 40BID. - Remdesivir started completed x5 days  - Dexamethasone started 11/27, D#9  - LASIX 40 IV BID started per Pulmonary- tolerating well   - monitor BMP       stable status today . Pt ok for intubation if needed   - off vapotherm on 12L > 9 L NC HFNC     DMII with steroid hyperglycemia    - Increased lantus to 20 BID  - ISS to high dose  - BG in 300s, improved now      Hypothyroid   - resumed synthroid.      HTN   - well controlled  - remains on losartan.      - Hold HCTZ with hyponatremia - hyponatremia improving (131)    MADISYN - on home bipap     DVT Prophylaxis: lovenox BID  Diet: DIET CARB CONTROL;  Code Status: Full Code     Dispo: cont care  Up in chair    Dc once oxygenation improves  Ambulate       Ramo Lockett MD 12/6/2020 7:56 AM

## 2020-12-06 NOTE — PLAN OF CARE
Problem: Airway Clearance - Ineffective  Goal: Achieve or maintain patent airway  Outcome: Ongoing     Problem: Gas Exchange - Impaired  Goal: Absence of hypoxia  Outcome: Ongoing  Goal: Promote optimal lung function  Outcome: Ongoing     Problem: Breathing Pattern - Ineffective  Goal: Ability to achieve and maintain a regular respiratory rate  Outcome: Ongoing     Problem:  Body Temperature -  Risk of, Imbalanced  Goal: Ability to maintain a body temperature within defined limits  Outcome: Ongoing  Goal: Complications related to the disease process, condition or treatment will be avoided or minimized  Outcome: Ongoing     Problem: Fatigue  Goal: Verbalize increase energy and improved vitality  Outcome: Ongoing

## 2020-12-07 LAB
ANION GAP SERPL CALCULATED.3IONS-SCNC: 9 MMOL/L (ref 3–16)
BUN BLDV-MCNC: 22 MG/DL (ref 7–20)
CALCIUM SERPL-MCNC: 8.7 MG/DL (ref 8.3–10.6)
CHLORIDE BLD-SCNC: 92 MMOL/L (ref 99–110)
CO2: 29 MMOL/L (ref 21–32)
CREAT SERPL-MCNC: 0.7 MG/DL (ref 0.8–1.3)
GFR AFRICAN AMERICAN: >60
GFR NON-AFRICAN AMERICAN: >60
GLUCOSE BLD-MCNC: 123 MG/DL (ref 70–99)
GLUCOSE BLD-MCNC: 160 MG/DL (ref 70–99)
GLUCOSE BLD-MCNC: 178 MG/DL (ref 70–99)
GLUCOSE BLD-MCNC: 195 MG/DL (ref 70–99)
GLUCOSE BLD-MCNC: 308 MG/DL (ref 70–99)
GLUCOSE BLD-MCNC: 98 MG/DL (ref 70–99)
PERFORMED ON: ABNORMAL
PERFORMED ON: NORMAL
POTASSIUM REFLEX MAGNESIUM: 3.8 MMOL/L (ref 3.5–5.1)
SODIUM BLD-SCNC: 130 MMOL/L (ref 136–145)

## 2020-12-07 PROCEDURE — 2580000003 HC RX 258: Performed by: INTERNAL MEDICINE

## 2020-12-07 PROCEDURE — 99232 SBSQ HOSP IP/OBS MODERATE 35: CPT | Performed by: INTERNAL MEDICINE

## 2020-12-07 PROCEDURE — 97110 THERAPEUTIC EXERCISES: CPT

## 2020-12-07 PROCEDURE — 80048 BASIC METABOLIC PNL TOTAL CA: CPT

## 2020-12-07 PROCEDURE — 6360000002 HC RX W HCPCS: Performed by: INTERNAL MEDICINE

## 2020-12-07 PROCEDURE — 6370000000 HC RX 637 (ALT 250 FOR IP): Performed by: INTERNAL MEDICINE

## 2020-12-07 PROCEDURE — 97530 THERAPEUTIC ACTIVITIES: CPT

## 2020-12-07 PROCEDURE — 2060000000 HC ICU INTERMEDIATE R&B

## 2020-12-07 PROCEDURE — 97535 SELF CARE MNGMENT TRAINING: CPT

## 2020-12-07 PROCEDURE — 36415 COLL VENOUS BLD VENIPUNCTURE: CPT

## 2020-12-07 RX ORDER — FUROSEMIDE 10 MG/ML
40 INJECTION INTRAMUSCULAR; INTRAVENOUS DAILY
Status: DISCONTINUED | OUTPATIENT
Start: 2020-12-07 | End: 2020-12-08

## 2020-12-07 RX ADMIN — INSULIN LISPRO 3 UNITS: 100 INJECTION, SOLUTION INTRAVENOUS; SUBCUTANEOUS at 12:40

## 2020-12-07 RX ADMIN — ENOXAPARIN SODIUM 40 MG: 40 INJECTION SUBCUTANEOUS at 08:00

## 2020-12-07 RX ADMIN — Medication 5000 UNITS: at 08:00

## 2020-12-07 RX ADMIN — INSULIN LISPRO 6 UNITS: 100 INJECTION, SOLUTION INTRAVENOUS; SUBCUTANEOUS at 20:45

## 2020-12-07 RX ADMIN — INSULIN GLARGINE 22 UNITS: 100 INJECTION, SOLUTION SUBCUTANEOUS at 20:45

## 2020-12-07 RX ADMIN — LEVOTHYROXINE SODIUM 100 MCG: 100 TABLET ORAL at 08:00

## 2020-12-07 RX ADMIN — ENOXAPARIN SODIUM 40 MG: 40 INJECTION SUBCUTANEOUS at 20:45

## 2020-12-07 RX ADMIN — INSULIN GLARGINE 22 UNITS: 100 INJECTION, SOLUTION SUBCUTANEOUS at 08:05

## 2020-12-07 RX ADMIN — FUROSEMIDE 40 MG: 10 INJECTION, SOLUTION INTRAMUSCULAR; INTRAVENOUS at 16:42

## 2020-12-07 RX ADMIN — LOSARTAN POTASSIUM 50 MG: 25 TABLET, FILM COATED ORAL at 08:00

## 2020-12-07 RX ADMIN — Medication 10 ML: at 20:45

## 2020-12-07 RX ADMIN — INSULIN LISPRO 3 UNITS: 100 INJECTION, SOLUTION INTRAVENOUS; SUBCUTANEOUS at 16:42

## 2020-12-07 RX ADMIN — DEXAMETHASONE SODIUM PHOSPHATE 4 MG: 4 INJECTION, SOLUTION INTRAMUSCULAR; INTRAVENOUS at 08:00

## 2020-12-07 ASSESSMENT — PAIN SCALES - GENERAL: PAINLEVEL_OUTOF10: 0

## 2020-12-07 NOTE — PLAN OF CARE
Problem: Falls - Risk of:  Goal: Will remain free from falls  Description: Will remain free from falls  12/7/2020 0730 by Carlos Alberto Santos RN  Outcome: Ongoing  12/7/2020 0326 by Mike Mcdaniel RN  Outcome: Ongoing  Goal: Absence of physical injury  Description: Absence of physical injury  12/7/2020 0730 by Carlos Alberto Santos RN  Outcome: Ongoing  12/7/2020 0326 by Mike Mcdaniel RN  Outcome: Ongoing     Problem: Airway Clearance - Ineffective  Goal: Achieve or maintain patent airway  12/7/2020 0730 by Carlos Alberto Santos RN  Outcome: Ongoing  12/7/2020 0326 by Mike Mcdaniel RN  Outcome: Ongoing     Problem: Gas Exchange - Impaired  Goal: Absence of hypoxia  12/7/2020 0730 by Carlos Alberto Santos RN  Outcome: Ongoing  12/7/2020 0326 by Mike Mcdaniel RN  Outcome: Ongoing  Goal: Promote optimal lung function  12/7/2020 0730 by Carlos Alberto Santos RN  Outcome: Ongoing  12/7/2020 0326 by Mike Mcdaniel RN  Outcome: Ongoing     Problem: Breathing Pattern - Ineffective  Goal: Ability to achieve and maintain a regular respiratory rate  12/7/2020 0730 by Carlos Alberto Santos RN  Outcome: Ongoing  12/7/2020 0326 by Mike Mcdaniel RN  Outcome: Ongoing     Problem:  Body Temperature -  Risk of, Imbalanced  Goal: Ability to maintain a body temperature within defined limits  Outcome: Ongoing  Goal: Will regain or maintain usual level of consciousness  Outcome: Ongoing  Goal: Complications related to the disease process, condition or treatment will be avoided or minimized  Outcome: Ongoing     Problem: Isolation Precautions - Risk of Spread of Infection  Goal: Prevent transmission of infection  Outcome: Ongoing     Problem: Nutrition Deficits  Goal: Optimize nutrtional status  Outcome: Ongoing     Problem: Risk for Fluid Volume Deficit  Goal: Maintain normal heart rhythm  Outcome: Ongoing  Goal: Maintain absence of muscle cramping  Outcome: Ongoing  Goal: Maintain normal serum potassium, sodium, calcium, phosphorus, and pH  Outcome: Ongoing     Problem: Loneliness or Risk for Loneliness  Goal: Demonstrate positive use of time alone when socialization is not possible  Outcome: Ongoing     Problem: Fatigue  Goal: Verbalize increase energy and improved vitality  Outcome: Ongoing     Problem: Patient Education: Go to Patient Education Activity  Goal: Patient/Family Education  Outcome: Ongoing

## 2020-12-07 NOTE — PROGRESS NOTES
Pulmonary Progress Note    CC: COVID 19 pneumonia    S:   Continues to feel better    Invasive Lines: IV:       Vitals:  /79   Pulse 63   Temp 96.8 °F (36 °C) (Axillary)   Resp 18   Ht 5' 9\" (1.753 m)   Wt 215 lb 9 oz (97.8 kg)   SpO2 96%   BMI 31.83 kg/m²   on 2 L     Intake/Output Summary (Last 24 hours) at 12/7/2020 0803  Last data filed at 12/7/2020 0736  Gross per 24 hour   Intake 730 ml   Output 2425 ml   Net -1695 ml     EXAM:  Constitutional:  No acute distress. HENT:  Oropharynx is clear and moist.   Neck: No tracheal deviation present. Cardiovascular: Normal heart sounds. No lower extremity edema. Pulmonary/Chest: No wheezes. No rhonchi. + rales. No decreased breath sounds. No accessory muscle usage or stridor. Musculoskeletal: No cyanosis. No clubbing. Skin: Skin is warm and dry. Psychiatric: Normal mood and affect.   Neurologic: speech fluent, alert and oriented, strength symmetric        Scheduled Meds:   dexamethasone  4 mg Intravenous Daily    insulin glargine  22 Units Subcutaneous BID    insulin lispro  0-18 Units Subcutaneous TID WC    insulin lispro  0-9 Units Subcutaneous Nightly    enoxaparin  40 mg Subcutaneous BID    vitamin D3  5,000 Units Oral Daily    levothyroxine  100 mcg Oral Daily    losartan  50 mg Oral Daily    sodium chloride flush  10 mL Intravenous 2 times per day     PRN Meds:  sennosides-docusate sodium, sodium chloride flush, acetaminophen **OR** acetaminophen, polyethylene glycol, promethazine **OR** ondansetron, sodium chloride, glucose, dextrose, glucagon (rDNA), dextrose    Results:  CBC:   Recent Labs     12/05/20  0502 12/06/20  0436   WBC 10.0 8.5   HGB 13.5 13.4*   HCT 39.5* 39.2*   MCV 95.0 94.9    248     BMP:   Recent Labs     12/05/20  0502 12/06/20  0436 12/07/20  0444   * 129* 130*   K 4.1 3.9 3.8   CL 91* 91* 92*   CO2 30 27 29   BUN 29* 26* 22*   CREATININE 0.8 0.7* 0.7*     LIVER PROFILE:   Recent Labs 12/05/20  0502 12/06/20  0436   AST 19 19   ALT 30 30   BILITOT 1.0 1.0   ALKPHOS 67 62       Micro:  11/27/20- sars Cov2- detected on pcr     CXR 11/26  Patchy airspace opacities suspicious for pneumonia.  An atypical or viral    pneumonia is suspected         ASSESSMENT:  · Acute hypoxemic respiratory failure   · COVID-19 pneumonia  · HTN  · DM2     PLAN:  · Supplemental oxygen to maintain SaO2 >92%; wean as tolerated   · S/P 1 u CCP  · Completed 5 days Remdesevir   · Tapering Decadron  · Lasix per internal medicine  · Okay with me for discharge planning

## 2020-12-07 NOTE — PLAN OF CARE
Problem: Falls - Risk of:  Goal: Will remain free from falls  Description: Will remain free from falls  Outcome: Ongoing  Goal: Absence of physical injury  Description: Absence of physical injury  Outcome: Ongoing     Problem: Airway Clearance - Ineffective  Goal: Achieve or maintain patent airway  Outcome: Ongoing     Problem: Gas Exchange - Impaired  Goal: Absence of hypoxia  Outcome: Ongoing  Goal: Promote optimal lung function  Outcome: Ongoing     Problem: Breathing Pattern - Ineffective  Goal: Ability to achieve and maintain a regular respiratory rate  Outcome: Ongoing

## 2020-12-07 NOTE — PROGRESS NOTES
Titrated O2 down to 2L. Tolerated well. Remained above 95%. Turned O2 off. Patient in high 80's. Turned back on 2L. Lasix ordered and administered.

## 2020-12-07 NOTE — PROGRESS NOTES
Hospitalist Progress Note      PCP: No primary care provider on file. Date of Admission: 11/26/2020    Chief Complaint: SOB. The patient is a 77 y.o. male w hx of DMII, HTN, Hypothyroidism, who presents with SOB.      Pt reports he first started feeling ill 2 weeks ago. Him and his daughter went to get tested albeit he is unable to tell me where he was tested and what the result of the test was.      He was reportedly tested positive for COVID in [address] - I do not see the results in our system. Confirmed here on 11/27     He presented to the hospital with worsening dyspnea and cough. He was found to be profoundly hypoxic - on AIRVO. 11/28  oxygenation worse. Vapotherm, to ICU     11/30 - 12/3  Remains on AirVO.    Seen sitting up in chair, feels better today and used bipap last night  Watching ball game today     12/4- switched to 10 L N[place],     12/6- remains on 9 L     12/7   now on RA         Medications:  Reviewed    Infusion Medications    dextrose       Scheduled Medications    dexamethasone  4 mg Intravenous Daily    insulin glargine  22 Units Subcutaneous BID    insulin lispro  0-18 Units Subcutaneous TID WC    insulin lispro  0-9 Units Subcutaneous Nightly    enoxaparin  40 mg Subcutaneous BID    vitamin D3  5,000 Units Oral Daily    levothyroxine  100 mcg Oral Daily    losartan  50 mg Oral Daily    sodium chloride flush  10 mL Intravenous 2 times per day     PRN Meds: sennosides-docusate sodium, sodium chloride flush, acetaminophen **OR** acetaminophen, polyethylene glycol, promethazine **OR** ondansetron, sodium chloride, glucose, dextrose, glucagon (rDNA), dextrose      Intake/Output Summary (Last 24 hours) at 12/7/2020 1400  Last data filed at 12/7/2020 0318  Gross per 24 hour   Intake 650 ml   Output 2425 ml   Net -1775 ml       Physical Exam Performed:    /79   Pulse 63   Temp 96.8 °F (36 °C) (Axillary)   Resp 18   Ht 5' 9\" (1.753 m)   Wt 215 lb 9 oz (97.8 kg)   SpO2 96%   BMI 31.83 kg/m²     Pt not seen at bedside        Spoke with him over the phone   He is doing well. Wishes t ogo home    D/W nurse         Labs:   Recent Labs     12/05/20  0502 12/06/20  0436   WBC 10.0 8.5   HGB 13.5 13.4*   HCT 39.5* 39.2*    248     Recent Labs     12/05/20  0502 12/06/20  0436 12/07/20  0444   * 129* 130*   K 4.1 3.9 3.8   CL 91* 91* 92*   CO2 30 27 29   BUN 29* 26* 22*   CREATININE 0.8 0.7* 0.7*   CALCIUM 9.2 8.7 8.7     Recent Labs     12/05/20  0502 12/06/20  0436   AST 19 19   ALT 30 30   BILITOT 1.0 1.0   ALKPHOS 67 62     No results for input(s): INR in the last 72 hours. No results for input(s): Mirian Comment in the last 72 hours. Urinalysis:    No results found for: Ceola Beery, BACTERIA, RBCUA, BLOODU, [address]    Radiology:  XR CHEST 1 VIEW   Final Result   Patchy airspace opacities suspicious for pneumonia. An atypical or viral   pneumonia is suspected. Assessment/Plan:    Active Hospital Problems    Diagnosis    Steroid-induced hyperglycemia [R73.9, T38.0X5A]    Acute respiratory failure with hypoxia (HCC) [J96.01]    Pneumonia due to COVID-19 virus [U07.1, J12.89]             Assessment/Plan:         Active Hospital Problems     Diagnosis    Steroid-induced hyperglycemia [R73.9, T38.0X5A]    Acute respiratory failure with hypoxia (HCC) [J96.01]    Pneumonia due to COVID-19 virus [U07.1, J12.89]         Acute Hypox Resp Failure   2/2 COVID PNA - positive 11/27    - S/p CCP 11/26 1 unit  - Lovenox 40BID. - Remdesivir started completed x5 days  - Dexamethasone started 11/27, D#9  - LASIX 40 IV BID started per Pulmonary- tolerating well   - monitor BMP  Was on high flow O2  On 2 L of O2     DMII with steroid hyperglycemia    - Increased lantus to 20 BID  - ISS to high dose  - BG in 300s, improved now      Hypothyroid   - resumed synthroid.      HTN   - well controlled  - remains on losartan.      - Hold HCTZ with hyponatremia - hyponatremia improving (131)    MADISYN - on home bipap     DVT Prophylaxis: lovenox BID  Diet: DIET CARB CONTROL;  Code Status: Full Code     Dispo: cont care  Up in chair    Dc once oxygenation improves  Ambulate       Mark Ty MD 12/7/2020 2:00 PM

## 2020-12-07 NOTE — PROGRESS NOTES
12/06/20 4953   NIV Type   $NIV $Daily Charge   Equipment Type v60   Mode Bilevel   Mask Type Full face mask   Mask Size Large   Settings/Measurements   IPAP 16 cmH20   CPAP/EPAP 8 cmH2O   Rate Ordered 12   Resp 18   FiO2  50 %   Vt Exhaled 785 mL   Minute Volume 12 Liters   Mask Leak (lpm) 25 lpm   Comfort Level Good   Using Accessory Muscles No   SpO2 97

## 2020-12-07 NOTE — CARE COORDINATION
INTERDISCIPLINARY PLAN OF CARE CONFERENCE    Date/Time: 12/7/2020 4:19 PM  Completed by: Dontae Pennington, Case Management      Patient Name:  An Merchant  YOB: 1955  Admitting Diagnosis: Pneumonia due to COVID-19 virus [U07.1, J12.89]     Admit Date/Time:  11/26/2020  1:02 AM    Chart reviewed. Interdisciplinary team contacted or reviewed plan related to patient progress and discharge plans. Disciplines included Case Management, Nursing, and Dietitian. Current Status:stable; back on 2L will get dose of lasix today  PT/OT recommendation for discharge plan of care: Home 24 hr assist  and Home OT    Expected D/C Disposition:  Home    Discharge Plan Comments: Chart reviewed. Plan continues for home at discharge. Will follow for HHC/O2 needs.      Home O2 in place on admit: No

## 2020-12-07 NOTE — PLAN OF CARE
Problem: Falls - Risk of:  Goal: Will remain free from falls  Description: Will remain free from falls  12/7/2020 1851 by Severo Stone RN  Outcome: Ongoing  12/7/2020 0730 by Severo Stone RN  Outcome: Ongoing  Goal: Absence of physical injury  Description: Absence of physical injury  12/7/2020 1851 by Severo Stone RN  Outcome: Ongoing  12/7/2020 0730 by Severo Stone RN  Outcome: Ongoing     Problem: Airway Clearance - Ineffective  Goal: Achieve or maintain patent airway  12/7/2020 1851 by Severo Stone RN  Outcome: Ongoing  12/7/2020 0730 by Severo Stone RN  Outcome: Ongoing     Problem: Gas Exchange - Impaired  Goal: Absence of hypoxia  12/7/2020 1851 by Severo Stone RN  Outcome: Ongoing  12/7/2020 0730 by Severo Stone RN  Outcome: Ongoing  Goal: Promote optimal lung function  12/7/2020 1851 by Severo Stone RN  Outcome: Ongoing  12/7/2020 0730 by Severo Stone RN  Outcome: Ongoing     Problem: Breathing Pattern - Ineffective  Goal: Ability to achieve and maintain a regular respiratory rate  12/7/2020 1851 by Severo Stone RN  Outcome: Ongoing  12/7/2020 0730 by Severo Stone RN  Outcome: Ongoing     Problem:  Body Temperature -  Risk of, Imbalanced  Goal: Ability to maintain a body temperature within defined limits  12/7/2020 1851 by Severo Stone RN  Outcome: Ongoing  12/7/2020 0730 by Severo Stone RN  Outcome: Ongoing  Goal: Will regain or maintain usual level of consciousness  12/7/2020 1851 by Severo Stone RN  Outcome: Ongoing  12/7/2020 0730 by Severo Stone RN  Outcome: Ongoing  Goal: Complications related to the disease process, condition or treatment will be avoided or minimized  12/7/2020 1851 by Severo Stone RN  Outcome: Ongoing  12/7/2020 0730 by Severo Stone RN  Outcome: Ongoing     Problem: Isolation Precautions - Risk of Spread of Infection  Goal: Prevent transmission of infection  12/7/2020 1851 by Severo Stone RN  Outcome: Ongoing  12/7/2020 0730 by Holly Hylton RN  Outcome: Ongoing     Problem: Nutrition Deficits  Goal: Optimize nutrtional status  12/7/2020 1851 by Holly Hylton RN  Outcome: Ongoing  12/7/2020 0730 by Holly Hylton RN  Outcome: Ongoing     Problem: Risk for Fluid Volume Deficit  Goal: Maintain normal heart rhythm  12/7/2020 1851 by Holly Hylton RN  Outcome: Ongoing  12/7/2020 0730 by Holly Hylton RN  Outcome: Ongoing  Goal: Maintain absence of muscle cramping  12/7/2020 1851 by Holly Hylton RN  Outcome: Ongoing  12/7/2020 0730 by Holly Hylton RN  Outcome: Ongoing  Goal: Maintain normal serum potassium, sodium, calcium, phosphorus, and pH  12/7/2020 1851 by Holly Hylton RN  Outcome: Ongoing  12/7/2020 0730 by Holly Hylton RN  Outcome: Ongoing     Problem: Loneliness or Risk for Loneliness  Goal: Demonstrate positive use of time alone when socialization is not possible  12/7/2020 1851 by Holly Hylton RN  Outcome: Ongoing  12/7/2020 0730 by Holly Hylton RN  Outcome: Ongoing     Problem: Fatigue  Goal: Verbalize increase energy and improved vitality  12/7/2020 1851 by Holly Hylton RN  Outcome: Ongoing  12/7/2020 0730 by Holly Hylton RN  Outcome: Ongoing     Problem: Patient Education: Go to Patient Education Activity  Goal: Patient/Family Education  12/7/2020 1851 by Holly Hylton RN  Outcome: Ongoing  12/7/2020 0730 by Holly Hylton RN  Outcome: Ongoing

## 2020-12-07 NOTE — PROGRESS NOTES
End of shift report given to Mckenna CHAPPELL. Pt in stable condition, care transferred at this time.  Electronically signed by Mike Mcdaniel RN on 12/7/2020 at 7:24 AM

## 2020-12-07 NOTE — PROGRESS NOTES
Occupational Therapy Daily Treatment Note    Unit: PCU  Date:  12/7/2020  Patient Name:    Gayle Campos  Admitting diagnosis:  Pneumonia due to COVID-19 virus [U07.1, J12.89]  Admit Date:  11/26/2020  Precautions/Restrictions:  Fall risk, 02 at 2 liters , telemetry, continous pulse ox covid isolation precautions- droplet plus      Discharge Recommendations: Home 24 hr assist  and Home OT  DME needs for discharge: Needs Met       Therapy recommendations for staff:   Assist of 1 with use of gait belt for all transfers and ambulation within room    AM-PAC Score: AM-PAC Inpatient Daily Activity Raw Score: 19  Home Health S4 Level: Level 1- Standard       Treatment Time:  8334-2718  Treatment number:  3  Timed code treatment minutes: 50 minutes  Total treatment minutes:   50 minutes    History of Present Illness: Per H&P from Dr. Herbert Moon  The patient is Z 64 y. o. male w hx of DMII, HTN, Hypothyroidism, who presents with SOB. Pt reports he first started feeling ill 2 weeks ago. Him and his daughter went to get tested albeit he is unable to tell me where he was tested and what the result of the test was. He was reportedly tested positive for COVID in [place] - I do not see the results in our system. He presented to the hospital with worsening dyspnea and cough. He was found to be profoundly hypoxic - he is on AIRVO now 35l/min with 75% FiO2. He feels a little better now.     Subjective:  Patient up in chair and agreeable to therapy.     Pain   No  Rating: NA  Location:  Pain Medicine Status: No request made      Bed Mobility: PT up in the chair   Supine to Sit:  Not Tested  Sit to Supine:  Not Tested  Rolling:           Not Tested  Scooting:        SBA    Transfer Training:   Sit to stand:   Supervision- IND  Stand to sit:  Supervision-IND  Bed to Chair:  Supervision with no AD   Bed to [facility]:   Not Tested  Standard toilet:   Not Tested     Completed functional mobility in room x 3 laps with supervision  Activity Tolerance   Pt completed therapy session with 88-93%  after functional mobility in room with 2 liters of 02. The pt was 90% on room air at rest and 89-90 after ambulation 1 lap in the room and recovered to 90% with in one min . ADL Training:   Upper body dressing:  Min due to lines   Upper body bathing:  IND with bath wipes except for bathing back   Lower body dressing:  Supervision to change pants and IND with changing socks. Lower body bathing:  Supervision to stand and bathe srinath area   Toileting:   Pt stood with supervision to use urinal   Grooming/Hygiene:  Not Tested    Therapeutic Exercise: all completed bilaterally unless indicated  Red theraband  Shoulder horizontal abduction 10x  Elbow flex/ext 10x     Recommended completing on own during commercials for HEP. Patient Education:   Role of OT  Recommendations for DC  Energy conservation techniques  Oxygen tubing management   UE AROM HEP    Positioning Needs:   Pt up in chair, no alarm needed, positioned in proper neutral alignment and pressure relief provided. Call light provided and all needs within reach    Family Present:  No    Assessment: Pt is supervision with sit to stand and transfer. The pt was 88-93% with 2 liters of o2 on and on room air the pt was 89-90% after ambulating one lap in room. Pt supervision for ADLs. Pt performed UE exercises with theraband. Pt instructed in energy conservation. The pt tolerated tx well. GOALS  To be met in 3 Visits:  1). Bed to toilet/BSC: Supervision     To be met in 5 Visits:  1). Supine to/from Sit:             Supervision  2). Upper Body Bathing:         Supervision- goal met 12-7 new goal IND   3). Lower Body Bathing:         SBA- goal met 12-7 new goal sup-IND   4). Upper Body Dressing:       Supervision  5). Lower Body Dressing:       SBA- goall met 12-7 new goal sup -IND   6).  Pt to demonstrate UE exs x 15 reps with minimal cues      Plan: cont with [address] [place] [address] Dr HARRY/L [id number]        If patient discharges from this facility prior to next visit, this note will serve as the Discharge Summary

## 2020-12-08 VITALS
BODY MASS INDEX: 32.03 KG/M2 | DIASTOLIC BLOOD PRESSURE: 68 MMHG | HEIGHT: 69 IN | OXYGEN SATURATION: 91 % | SYSTOLIC BLOOD PRESSURE: 113 MMHG | WEIGHT: 216.25 LBS | RESPIRATION RATE: 18 BRPM | HEART RATE: 67 BPM | TEMPERATURE: 96.8 F

## 2020-12-08 LAB
ANION GAP SERPL CALCULATED.3IONS-SCNC: 8 MMOL/L (ref 3–16)
BUN BLDV-MCNC: 25 MG/DL (ref 7–20)
CALCIUM SERPL-MCNC: 8.8 MG/DL (ref 8.3–10.6)
CHLORIDE BLD-SCNC: 94 MMOL/L (ref 99–110)
CO2: 28 MMOL/L (ref 21–32)
CREAT SERPL-MCNC: 0.8 MG/DL (ref 0.8–1.3)
GFR AFRICAN AMERICAN: >60
GFR NON-AFRICAN AMERICAN: >60
GLUCOSE BLD-MCNC: 112 MG/DL (ref 70–99)
GLUCOSE BLD-MCNC: 112 MG/DL (ref 70–99)
GLUCOSE BLD-MCNC: 145 MG/DL (ref 70–99)
GLUCOSE BLD-MCNC: 154 MG/DL (ref 70–99)
PERFORMED ON: ABNORMAL
POTASSIUM SERPL-SCNC: 4.2 MMOL/L (ref 3.5–5.1)
SODIUM BLD-SCNC: 130 MMOL/L (ref 136–145)

## 2020-12-08 PROCEDURE — 6370000000 HC RX 637 (ALT 250 FOR IP): Performed by: INTERNAL MEDICINE

## 2020-12-08 PROCEDURE — 99232 SBSQ HOSP IP/OBS MODERATE 35: CPT | Performed by: INTERNAL MEDICINE

## 2020-12-08 PROCEDURE — 6360000002 HC RX W HCPCS: Performed by: INTERNAL MEDICINE

## 2020-12-08 PROCEDURE — 80048 BASIC METABOLIC PNL TOTAL CA: CPT

## 2020-12-08 PROCEDURE — 99239 HOSP IP/OBS DSCHRG MGMT >30: CPT | Performed by: INTERNAL MEDICINE

## 2020-12-08 PROCEDURE — 36415 COLL VENOUS BLD VENIPUNCTURE: CPT

## 2020-12-08 PROCEDURE — 2580000003 HC RX 258: Performed by: INTERNAL MEDICINE

## 2020-12-08 RX ORDER — GLIPIZIDE 5 MG/1
10 TABLET ORAL
Status: DISCONTINUED | OUTPATIENT
Start: 2020-12-08 | End: 2020-12-08 | Stop reason: HOSPADM

## 2020-12-08 RX ORDER — DEXAMETHASONE 1 MG
2 TABLET ORAL DAILY
Status: DISCONTINUED | OUTPATIENT
Start: 2020-12-08 | End: 2020-12-08 | Stop reason: HOSPADM

## 2020-12-08 RX ORDER — DEXAMETHASONE 1 MG
TABLET ORAL
Qty: 6 TABLET | Refills: 0 | Status: SHIPPED | OUTPATIENT
Start: 2020-12-08

## 2020-12-08 RX ADMIN — DEXAMETHASONE 2 MG: 1 TABLET ORAL at 10:52

## 2020-12-08 RX ADMIN — Medication 10 ML: at 09:48

## 2020-12-08 RX ADMIN — LOSARTAN POTASSIUM 50 MG: 25 TABLET, FILM COATED ORAL at 09:47

## 2020-12-08 RX ADMIN — Medication 5000 UNITS: at 09:47

## 2020-12-08 RX ADMIN — INSULIN GLARGINE 22 UNITS: 100 INJECTION, SOLUTION SUBCUTANEOUS at 10:53

## 2020-12-08 RX ADMIN — LEVOTHYROXINE SODIUM 100 MCG: 100 TABLET ORAL at 09:47

## 2020-12-08 RX ADMIN — METFORMIN HYDROCHLORIDE 500 MG: 500 TABLET ORAL at 09:47

## 2020-12-08 RX ADMIN — METFORMIN HYDROCHLORIDE 500 MG: 500 TABLET ORAL at 16:19

## 2020-12-08 RX ADMIN — GLIPIZIDE 10 MG: 5 TABLET ORAL at 16:19

## 2020-12-08 RX ADMIN — ENOXAPARIN SODIUM 40 MG: 40 INJECTION SUBCUTANEOUS at 09:47

## 2020-12-08 NOTE — CARE COORDINATION
DISCHARGE ORDER  Date/Time 2020 9:46 AM  Completed by: Orlin Rodríguez, Case Management    Patient Name: Jet Ling      : 1955  Admitting Diagnosis: Pneumonia due to COVID-19 virus [U07.1, J12.89]      Admit order Date and Status:2020 0102 inpt  (verify MD's last order for status of admission)      Noted discharge order. If applicable PT/OT recommendation at Discharge: home with prn assist and  Home PT/OT  DME recommendation by PT/OT:n/a  Confirmed discharge plan  (pt): Yes  with whom____________pt___  If pt confirmed DC plan does family need to be contacted by CM No if yes who____n/a__  Discharge Plan: Reviewed chart. Role of discharge planner explained and patient verbalized understanding. Discharge order is noted. Pt is being d/c'd to e today. Pt is aware that PT. OT recommend home with prn assist and  Home PT/OT. Pt declines HHC and states that if he needs it or decides once he goes home, then he will call his PCP. Pt states that he lives at home with his wife. Pt is Covid positive as of 2020. Pt's O2 sats are 91% on 1L. No further discharge needs needed or noted. Shreya Mary RN is walking pt and let CM know if pt needs home O2. Pt states that if he needs home O2 then he would want [facility], as [facility] is the only one that goes out to [place], [place]--Address is:  [address] [place] [address]. Reviewed chart. Role of discharge planner explained and patient verbalized understanding. Discharge order is noted. Has Home O2 in place on admit:  No  Informed of need to bring portable home O2 tank on day of discharge for nursing to connect prior to leaving:   No  Verbalized agreement/Understanding:   No      Addendum 2020 1338: Per Shreya Mary RN, \" 97% on 1L oxygen, resting.      95% 1L oxygen ambulating.      92% on room air resting.     90% ambulating.     No complaints on shortness of breath. \"      CM called [facility] (DME company for home O2) and spoke with Ann-Marie Rinaldi ([phone]). Ann-Marie Rinaldi is aware that pt is Covid positive. . Pt has [employer] for insurance. She states Medicare guidelines are that pt has to be at 88% or less even with Covid. Pt is at 90% at the lowest and that is while ambulating. CM spoke with Dr. Zack Hoskins. Per Dr. Zack Hoskins, he is aware of these results and states that pt is able to go home without O2. Discharge timeout done with Cele Mehta RN. All discharge needs and concerns addressed.

## 2020-12-08 NOTE — PROGRESS NOTES
Patient educated on discharge instructions as well as new medications use, dosage, administration and possible side effects. Patient verified knowledge. IV removed without difficulty and dry dressing in place. Telemetry monitor removed and returned to [facility]. Pt left facility in stable condition to home with all of their personal belongings.

## 2020-12-08 NOTE — PROGRESS NOTES
Pulmonary Progress Note    CC: COVID 19 pneumonia    S:   No new c/o     Invasive Lines: IV:       Vitals:  /68   Pulse 67   Temp 96.8 °F (36 °C) (Axillary)   Resp 18   Ht 5' 9\" (1.753 m)   Wt 216 lb 4 oz (98.1 kg)   SpO2 91%   BMI 31.93 kg/m²   on1 L     Intake/Output Summary (Last 24 hours) at 12/8/2020 1341  Last data filed at 12/8/2020 1335  Gross per 24 hour   Intake 840 ml   Output 1850 ml   Net -1010 ml     EXAM:  Constitutional:  No acute distress. HENT:  Oropharynx is clear and moist.   Neck: No tracheal deviation present. Cardiovascular: Normal heart sounds. No lower extremity edema. Pulmonary/Chest: No wheezes. No rhonchi. + rales. No decreased breath sounds. No accessory muscle usage or stridor. Musculoskeletal: No cyanosis. No clubbing. Skin: Skin is warm and dry. Psychiatric: Normal mood and affect.   Neurologic: speech fluent, alert and oriented, strength symmetric        Scheduled Meds:   glipiZIDE  10 mg Oral BID AC    metFORMIN  500 mg Oral BID WC    dexamethasone  2 mg Oral Daily    insulin glargine  22 Units Subcutaneous BID    insulin lispro  0-18 Units Subcutaneous TID WC    insulin lispro  0-9 Units Subcutaneous Nightly    enoxaparin  40 mg Subcutaneous BID    vitamin D3  5,000 Units Oral Daily    levothyroxine  100 mcg Oral Daily    losartan  50 mg Oral Daily    sodium chloride flush  10 mL Intravenous 2 times per day     PRN Meds:  sennosides-docusate sodium, sodium chloride flush, acetaminophen **OR** acetaminophen, polyethylene glycol, promethazine **OR** ondansetron, sodium chloride, glucose, dextrose, glucagon (rDNA), dextrose    Results:  CBC:   Recent Labs     12/06/20  0436   WBC 8.5   HGB 13.4*   HCT 39.2*   MCV 94.9        BMP:   Recent Labs     12/06/20  0436 12/07/20  0444 12/08/20  0507   * 130* 130*   K 3.9 3.8 4.2   CL 91* 92* 94*   CO2 27 29 28   BUN 26* 22* 25*   CREATININE 0.7* 0.7* 0.8     LIVER PROFILE:   Recent Labs

## 2020-12-08 NOTE — PROGRESS NOTES
97% on 1L oxygen, resting. 95% 1L oxygen ambulating. 92% on room air resting. 90% ambulating. No complaints on shortness of breath.

## 2020-12-08 NOTE — PROGRESS NOTES
End of shift report given to [facility]. Pt in stable condition, care transferred at this time.  Electronically signed by Jatin York RN on 12/8/2020 at 7:46 AM

## 2020-12-08 NOTE — PROGRESS NOTES
Shift assessment complete. Call light and bedside table within reach.  Electronically signed by Anjana Melchor RN on 12/7/2020 at 8:44 PM

## 2020-12-08 NOTE — PROGRESS NOTES
Hospitalist Progress Note      PCP: No primary care provider on file. Date of Admission: 11/26/2020    Chief Complaint: SOB. The patient is a 77 y.o. male w hx of DMII, HTN, Hypothyroidism, who presents with SOB.      Pt reports he first started feeling ill 2 weeks ago. Him and his daughter went to get tested albeit he is unable to tell me where he was tested and what the result of the test was.      He was reportedly tested positive for COVID in [place] - I do not see the results in our system. Confirmed here on 11/27     He presented to the hospital with worsening dyspnea and cough. He was found to be profoundly hypoxic - on AIRVO. 11/28  oxygenation worse. Vapotherm, to ICU     11/30 - 12/3  Remains on AirVO.    Seen sitting up in chair, feels better today and used bipap last night  Watching ball game today     12/4- switched to 10 L N[place],     12/6- remains on 9 L     12/7   now on RA         Medications:  Reviewed    Infusion Medications    dextrose       Scheduled Medications    furosemide  40 mg Intravenous Daily    dexamethasone  4 mg Intravenous Daily    insulin glargine  22 Units Subcutaneous BID    insulin lispro  0-18 Units Subcutaneous TID WC    insulin lispro  0-9 Units Subcutaneous Nightly    enoxaparin  40 mg Subcutaneous BID    vitamin D3  5,000 Units Oral Daily    levothyroxine  100 mcg Oral Daily    losartan  50 mg Oral Daily    sodium chloride flush  10 mL Intravenous 2 times per day     PRN Meds: sennosides-docusate sodium, sodium chloride flush, acetaminophen **OR** acetaminophen, polyethylene glycol, promethazine **OR** ondansetron, sodium chloride, glucose, dextrose, glucagon (rDNA), dextrose      Intake/Output Summary (Last 24 hours) at 12/8/2020 0834  Last data filed at 12/7/2020 1951  Gross per 24 hour   Intake 240 ml   Output 1500 ml   Net -1260 ml       Physical Exam Performed:    /68   Pulse 67   Temp 96.8 °F (36 °C) (Axillary)   Resp 18   Ht 5' 9\" (1.753 m)   Wt 216 lb 4 oz (98.1 kg)   SpO2 91%   BMI 31.93 kg/m²           General: elderly male up in bed, no distress   Awake, alert and oriented. Appears to be not in any distress  Mucous Membranes:  Pink , anicteric  Neck: No JVD, no carotid bruit, no thyromegaly  Chest:  Improving air entry  in bases with resolving crackles  Cardiovascular:  RRR S1S2 heard, no murmurs or gallops  Abdomen:  Soft, undistended, non tender, no organomegaly, BS present  Extremities: No edema or cyanosis. Distal pulses well felt  Neurological : grossly normal        Labs:   Recent Labs     12/06/20  0436   WBC 8.5   HGB 13.4*   HCT 39.2*        Recent Labs     12/06/20  0436 12/07/20  0444 12/08/20  0507   * 130* 130*   K 3.9 3.8 4.2   CL 91* 92* 94*   CO2 27 29 28   BUN 26* 22* 25*   CREATININE 0.7* 0.7* 0.8   CALCIUM 8.7 8.7 8.8     Recent Labs     12/06/20  0436   AST 19   ALT 30   BILITOT 1.0   ALKPHOS 62     No results for input(s): INR in the last 72 hours. No results for input(s): Connee Matar in the last 72 hours. Urinalysis:    No results found for: Alyne Claw, BACTERIA, RBCUA, BLOODU, [address], [address]    Radiology:  XR CHEST 1 VIEW   Final Result   Patchy airspace opacities suspicious for pneumonia. An atypical or viral   pneumonia is suspected.                  Assessment/Plan:    Active Hospital Problems    Diagnosis    Diabetes mellitus due to underlying condition without complication, without long-term current use of insulin ([facility] [E08.9]    Essential hypertension [I10]    Steroid-induced hyperglycemia [R73.9, T38.0X5A]    Acute respiratory failure with hypoxia (HCC) [J96.01]    Pneumonia due to COVID-19 virus [U07.1, J12.89]             Assessment/Plan:         Active Hospital Problems     Diagnosis    Steroid-induced hyperglycemia [R73.9, T38.0X5A]    Acute respiratory failure with hypoxia (HCC) [J96.01]    Pneumonia due to COVID-19 virus [U07.1, J12.89]         Acute Hypox Resp Failure 2/2 COVID PNA - positive 11/27    - S/p CCP 11/26 1 unit  - Lovenox 40BID. - Remdesivir started completed x5 days  - Dexamethasone started 11/27, D#9  - LASIX 40 IV BID started per Pulmonary- tolerating well   - monitor BMP  Was on high flow O2  On RA today     DMII with steroid hyperglycemia    - Increased lantus to 20 BID  - ISS to high dose  - BG in 300s, improved now      Hypothyroid   - resumed synthroid.      HTN   - well controlled  - remains on losartan.      - Hold HCTZ with hyponatremia - hyponatremia improving (131)    MADISYN - on home bipap     DVT Prophylaxis: lovenox BID  Diet: DIET CARB CONTROL;  Code Status: Full Code     Dispo: cont care  Up in chair    Likely d/c today       Jazmine Holloway MD 12/8/2020 8:34 AM

## 2020-12-09 ENCOUNTER — TELEPHONE (OUTPATIENT)
Dept: PULMONOLOGY | Age: 65
End: 2020-12-09

## 2020-12-09 ENCOUNTER — CARE COORDINATION (OUTPATIENT)
Dept: CASE MANAGEMENT | Age: 65
End: 2020-12-09

## 2020-12-09 NOTE — DISCHARGE SUMMARY
Name:  Viridiana Diaz  Room:  /9303-24  MRN:    [id number]    Discharge Summary      This discharge summary is in conjunction with a complete physical exam done on the day of discharge. Discharging Physician: Dr. Joaquin Rise: 11/26/2020  Discharge:  12/8/2020    HPI taken from admission H&P:    The patient is a 77 y.o. male w hx of DMII, HTN, Hypothyroidism, who presents with SOB.      Pt reports he first started feeling ill 2 weeks ago. Him and his daughter went to get tested albeit he is unable to tell me where he was tested and what the result of the test was.      He was reportedly tested positive for COVID in [place] - I do not see the results in our system.      He presented to the hospital with worsening dyspnea and cough. He was found to be profoundly hypoxic - he is on AIRVO now 35l/min with 75% FiO2.      He feels a little better now    Diagnoses this Admission and Hospital Course   Acute Hypox Resp Failure--resolved  2/2 COVID PNA - positive 11/27  - S/p CCP 11/26 1 unit  - Lovenox 40BID.    - Remdesivir started completed x5 days  - Dexamethasone started 11/27, D#10-->d/c on decadron taper  - LASIX 40 IV BID started per Pulmonary- tolerating well     DMII with steroid hyperglycemia  - Increased lantus to 20 BID  - ISS to high dose  - BG in 300s, improved now      Hypothyroid   - resumed synthroid.      HTN   - well controlled  - remains on losartan.      - Hold HCTZ with hyponatremia - hyponatremia improving (131)     MADISYN - on home bipap     Procedures (Please Review Full Report for Details)  None     Consults    Pulmonology    Physical Exam at Discharge:    /68   Pulse 67   Temp 96.8 °F (36 °C) (Axillary)   Resp 18   Ht 5' 9\" (1.753 m)   Wt 216 lb 4 oz (98.1 kg)   SpO2 91%   BMI 31.93 kg/m²     General: elderly male up in bed, no distress   Awake, alert and oriented.  Appears to be not in any distress  Mucous Membranes:  Pink , anicteric  Neck: No JVD, no carotid bruit, no thyromegaly  Chest:  Improving air entry  in bases with resolving crackles  Cardiovascular:  RRR S1S2 heard, no murmurs or gallops  Abdomen:  Soft, undistended, non tender, no organomegaly, BS present  Extremities: No edema or cyanosis. Distal pulses well felt  Neurological : grossly normal    CBC: No results for input(s): WBC, HGB, HCT, MCV, PLT in the last 72 hours. BMP:   Recent Labs     12/07/20  0444 12/08/20  0507   * 130*   K 3.8 4.2   CL 92* 94*   CO2 29 28   BUN 22* 25*   CREATININE 0.7* 0.8     CULTURES  SARS-CoV-2, PCR  DETECTEDAbnormal       RADIOLOGY  XR CHEST 1 VIEW   Final Result   Patchy airspace opacities suspicious for pneumonia. An atypical or viral   pneumonia is suspected. Discharge Medications     Medication List      START taking these medications    dexamethasone 1 MG tablet  Commonly known as:  DECADRON  2 qd- 2 days, 1 qd- 2 days        CONTINUE taking these medications    amLODIPine 5 MG tablet  Commonly known as:  NORVASC     atorvastatin 40 MG tablet  Commonly known as:  LIPITOR     glipiZIDE 10 MG tablet  Commonly known as:  GLUCOTROL     levothyroxine 100 MCG tablet  Commonly known as:  SYNTHROID     loratadine 10 MG capsule  Commonly known as:  CLARITIN     metFORMIN 500 MG tablet  Commonly known as:  GLUCOPHAGE        STOP taking these medications    losartan-hydroCHLOROthiazide 100-12.5 MG per tablet  Commonly known as:  HYZAAR           Where to Get Your Medications      These medications were sent to [id number], [place] [address]    Phone:  [phone]   · dexamethasone 1 MG tablet       Discharged in stable condition to home    Follow Up: Follow up with PCP      Total time spent on discharge is 35 minutes    MOHINI Barragan.

## 2020-12-09 NOTE — CARE COORDINATION
[facility] Transitions Initial Follow Up Call:  Patient doing well, denies any SOB, CP, cough, fever. Discussed discharge instructions and reviewed medications. He has the decadron and is taking as prescribed. He will contact PCP office, inform them that he was COVID+ and schedule follow up appointment. He declined [employer], no access to smart phone or computer. CTN will transition to  [address] for further follow up. Call within 2 business days of discharge: Yes    Patient: Don Lovett Patient : 1955   MRN: [id number]  Reason for Admission: COVID-19  Discharge Date: 20 RARS: Readmission Risk Score: 12      Last Discharge [facility]       Complaint Diagnosis Description Type Department Provider    20   Admission (Discharged) [facility] PCU Cristina Sousa MD           Spoke with: Don Lovett        Challenges to be reviewed by the provider   Additional needs identified to be addressed with provider No  none    Discussed COVID-19 related testing which was available at this time. Test results were positive. Patient informed of results, if available? Yes         Method of communication with provider : none    Advance Care Planning:   Does patient have an Advance Directive:  not on file; education provided. Was this a readmission? No  Patient stated reason for admission: \"Coronavirus\"  Patients top risk factors for readmission: medical condition    Care Transition Nurse (CTN) contacted the patient by telephone to perform post hospital discharge assessment. Verified name and  with patient as identifiers. Provided introduction to self, and explanation of the CTN role. CTN reviewed discharge instructions, medical action plan and red flags with patient who verbalized understanding. Patient given an opportunity to ask questions and does not have any further questions or concerns at this time. Were discharge instructions available to patient? Yes.  Reviewed appropriate site of care based on symptoms and resources available to patient including: PCP, Urgent care clinics and When to call 911. The patient agrees to contact the PCP office for questions related to their healthcare. Medication reconciliation was performed with patient, who verbalizes understanding of administration of home medications. Advised obtaining a 90-day supply of all daily and as-needed medications. Covid Risk Education    Patient has following risk factors of: diabetes. Education provided regarding infection prevention, and signs and symptoms of COVID-19 and when to seek medical attention with patient who verbalized understanding. Discussed exposure protocols and quarantine From CDC: Are you at higher risk for severe illness?   and given an opportunity for questions and concerns. The patient agrees to contact the COVID-19 hotline [phone] or PCP office for questions related to COVID-19. For more information on steps you can take to protect yourself, see CDC's How to Protect Yourself     Patient/family/caregiver given information for [employer] and agrees to enroll yes  Patient's preferred e-mail: declines  Patient's preferred phone number: declines    Discussed follow-up appointments. If no appointment was previously scheduled, appointment scheduling offered: Yes. Is follow up appointment scheduled within 7 days of discharge? No  [facility] follow up appointment(s): Patient will contact PCP Dr. Madelyn Lozano at [facility] in [facility] to schedule follow up appointment. Plan for follow-up call in 5-7 days based on severity of symptoms and risk factors. Plan for next call: symptom management-., self management-. and follow up appointment-. CTN provided contact information for future needs.           Non-face-to-face services provided:  Obtained and reviewed discharge summary and/or continuity of care documents    Care Transitions 24 Hour Call    Do you have any ongoing symptoms?:  No  Do you have a copy of your discharge instructions?:  Yes  Do you have all of your prescriptions and are they filled?:  Yes  Have you been contacted by a [address]?:  No  Have you scheduled your follow up appointment?:  No  Were you discharged with any Home Care or Post Acute Services:  No  Do you feel like you have everything you need to keep you well at home?:  Yes  Care Transitions Interventions         Follow Up  No future appointments.     Ernst Cleveland RN

## 2020-12-09 NOTE — TELEPHONE ENCOUNTER
The pt was able to be weaned off of O2 the afternoon of d/c. He does not appear to have home O2. Ok to f/u with PCP for CXR in 4-6 weeks.

## 2020-12-10 NOTE — TELEPHONE ENCOUNTER
Patient informed will contact PCP. What Type Of Note Output Would You Prefer (Optional)?: Standard Output How Severe Are Your Spot(S)?: mild Have Your Spot(S) Been Treated In The Past?: has not been treated Hpi Title: Evaluation of Skin Lesions

## 2020-12-16 ENCOUNTER — CARE COORDINATION (OUTPATIENT)
Dept: CASE MANAGEMENT | Age: 65
End: 2020-12-16

## 2020-12-16 NOTE — CARE COORDINATION
Opened in error.     Mariela KOROMAN, RN, [facility]  Care Transition Nurse   [phone] office  [phone] mobile

## 2020-12-21 ENCOUNTER — CARE COORDINATION (OUTPATIENT)
Dept: CASE MANAGEMENT | Age: 65
End: 2020-12-21

## 2020-12-21 NOTE — CARE COORDINATION
Patient resolved from the Care Transitions episode on 12/21/2020. COVID-19 Detected 11/27/2020. Unable to reach patient by phone. Message left stating purpose of call with contact information requesting return call. No further outreach scheduled with this CTN. Episode of Care resolved. Patient has this CTN contact information if future needs arise. Giana Youssef RN  Care Transition Nurse  [phone] mobile    No future appointments.